# Patient Record
Sex: MALE | Race: OTHER | NOT HISPANIC OR LATINO
[De-identification: names, ages, dates, MRNs, and addresses within clinical notes are randomized per-mention and may not be internally consistent; named-entity substitution may affect disease eponyms.]

---

## 2017-01-03 ENCOUNTER — RX RENEWAL (OUTPATIENT)
Age: 82
End: 2017-01-03

## 2017-01-04 ENCOUNTER — APPOINTMENT (OUTPATIENT)
Dept: INTERNAL MEDICINE | Facility: CLINIC | Age: 82
End: 2017-01-04

## 2017-01-04 ENCOUNTER — MEDICATION RENEWAL (OUTPATIENT)
Age: 82
End: 2017-01-04

## 2017-01-04 LAB
INR PPP: 2.4 RATIO
POCT-PROTHROMBIN TIME: 29.4 SECS
QUALITY CONTROL: YES

## 2017-01-06 ENCOUNTER — MEDICATION RENEWAL (OUTPATIENT)
Age: 82
End: 2017-01-06

## 2017-01-10 ENCOUNTER — APPOINTMENT (OUTPATIENT)
Dept: UROLOGY | Facility: CLINIC | Age: 82
End: 2017-01-10

## 2017-01-11 ENCOUNTER — APPOINTMENT (OUTPATIENT)
Dept: INTERNAL MEDICINE | Facility: CLINIC | Age: 82
End: 2017-01-11

## 2017-01-11 LAB
INR PPP: 2.9 RATIO
POCT-PROTHROMBIN TIME: 35.4 SECS
QUALITY CONTROL: YES

## 2017-01-13 LAB
ANION GAP SERPL CALC-SCNC: 14 MMOL/L
BUN SERPL-MCNC: 21 MG/DL
CALCIUM SERPL-MCNC: 9.9 MG/DL
CHLORIDE SERPL-SCNC: 93 MMOL/L
CO2 SERPL-SCNC: 22 MMOL/L
CORTIS SERPL-MCNC: 13.6 UG/DL
CREAT SERPL-MCNC: 1.02 MG/DL
GLUCOSE SERPL-MCNC: 93 MG/DL
POTASSIUM SERPL-SCNC: 5.3 MMOL/L
SODIUM SERPL-SCNC: 129 MMOL/L

## 2017-01-20 ENCOUNTER — MEDICATION RENEWAL (OUTPATIENT)
Age: 82
End: 2017-01-20

## 2017-01-23 ENCOUNTER — MEDICATION RENEWAL (OUTPATIENT)
Age: 82
End: 2017-01-23

## 2017-02-01 ENCOUNTER — LABORATORY RESULT (OUTPATIENT)
Age: 82
End: 2017-02-01

## 2017-02-02 ENCOUNTER — APPOINTMENT (OUTPATIENT)
Dept: INTERNAL MEDICINE | Facility: CLINIC | Age: 82
End: 2017-02-02

## 2017-02-02 LAB
INR PPP: 3 RATIO
POCT-PROTHROMBIN TIME: 35.6 SECS
QUALITY CONTROL: YES

## 2017-02-03 ENCOUNTER — MEDICATION RENEWAL (OUTPATIENT)
Age: 82
End: 2017-02-03

## 2017-02-03 LAB
ALBUMIN SERPL ELPH-MCNC: 3.9 G/DL
ALP BLD-CCNC: 51 U/L
ALT SERPL-CCNC: 29 U/L
ANION GAP SERPL CALC-SCNC: 14 MMOL/L
AST SERPL-CCNC: 30 U/L
BASOPHILS # BLD AUTO: 0.03 K/UL
BASOPHILS NFR BLD AUTO: 0.4 %
BILIRUB SERPL-MCNC: 0.4 MG/DL
BUN SERPL-MCNC: 20 MG/DL
CALCIUM SERPL-MCNC: 9.1 MG/DL
CHLORIDE SERPL-SCNC: 102 MMOL/L
CO2 SERPL-SCNC: 22 MMOL/L
CREAT SERPL-MCNC: 0.99 MG/DL
EOSINOPHIL # BLD AUTO: 0.09 K/UL
EOSINOPHIL NFR BLD AUTO: 1.3 %
GLUCOSE SERPL-MCNC: 94 MG/DL
HCT VFR BLD CALC: 34.9 %
HGB BLD-MCNC: 11.4 G/DL
IMM GRANULOCYTES NFR BLD AUTO: 0.4 %
LYMPHOCYTES # BLD AUTO: 1.22 K/UL
LYMPHOCYTES NFR BLD AUTO: 17.4 %
MAN DIFF?: NORMAL
MCHC RBC-ENTMCNC: 31.6 PG
MCHC RBC-ENTMCNC: 32.7 GM/DL
MCV RBC AUTO: 96.7 FL
MONOCYTES # BLD AUTO: 0.86 K/UL
MONOCYTES NFR BLD AUTO: 12.3 %
NEUTROPHILS # BLD AUTO: 4.79 K/UL
NEUTROPHILS NFR BLD AUTO: 68.2 %
PLATELET # BLD AUTO: 91 K/UL
POTASSIUM SERPL-SCNC: 4.5 MMOL/L
PROT SERPL-MCNC: 6.5 G/DL
RBC # BLD: 3.61 M/UL
RBC # FLD: 14.8 %
SODIUM SERPL-SCNC: 138 MMOL/L
WBC # FLD AUTO: 7.02 K/UL

## 2017-02-14 ENCOUNTER — APPOINTMENT (OUTPATIENT)
Dept: NEPHROLOGY | Facility: CLINIC | Age: 82
End: 2017-02-14

## 2017-02-14 VITALS
BODY MASS INDEX: 31.71 KG/M2 | SYSTOLIC BLOOD PRESSURE: 134 MMHG | DIASTOLIC BLOOD PRESSURE: 70 MMHG | WEIGHT: 202 LBS | HEIGHT: 67 IN | OXYGEN SATURATION: 99 % | HEART RATE: 55 BPM

## 2017-02-15 LAB
ALBUMIN SERPL ELPH-MCNC: 4 G/DL
ANION GAP SERPL CALC-SCNC: 10 MMOL/L
BUN SERPL-MCNC: 24 MG/DL
CALCIUM SERPL-MCNC: 8.8 MG/DL
CHLORIDE SERPL-SCNC: 100 MMOL/L
CO2 SERPL-SCNC: 28 MMOL/L
CREAT SERPL-MCNC: 0.98 MG/DL
GLUCOSE SERPL-MCNC: 93 MG/DL
MAGNESIUM SERPL-MCNC: 2.1 MG/DL
PHOSPHATE SERPL-MCNC: 3.6 MG/DL
POTASSIUM SERPL-SCNC: 4.3 MMOL/L
SODIUM SERPL-SCNC: 138 MMOL/L
URATE SERPL-MCNC: 8.2 MG/DL

## 2017-02-22 ENCOUNTER — APPOINTMENT (OUTPATIENT)
Dept: INTERNAL MEDICINE | Facility: CLINIC | Age: 82
End: 2017-02-22

## 2017-02-22 ENCOUNTER — LABORATORY RESULT (OUTPATIENT)
Age: 82
End: 2017-02-22

## 2017-02-22 VITALS
OXYGEN SATURATION: 96 % | HEIGHT: 67 IN | SYSTOLIC BLOOD PRESSURE: 134 MMHG | DIASTOLIC BLOOD PRESSURE: 74 MMHG | HEART RATE: 54 BPM | WEIGHT: 197 LBS | BODY MASS INDEX: 30.92 KG/M2 | TEMPERATURE: 97.3 F

## 2017-02-22 DIAGNOSIS — R79.89 OTHER SPECIFIED ABNORMAL FINDINGS OF BLOOD CHEMISTRY: ICD-10-CM

## 2017-02-22 DIAGNOSIS — R05 COUGH: ICD-10-CM

## 2017-02-22 LAB
INR PPP: 2.5 RATIO
POCT-PROTHROMBIN TIME: 30 SECS

## 2017-02-22 RX ORDER — QUETIAPINE FUMARATE 25 MG/1
25 TABLET ORAL
Qty: 30 | Refills: 5 | Status: DISCONTINUED | COMMUNITY
Start: 2017-01-06 | End: 2017-02-22

## 2017-02-24 LAB
ANION GAP SERPL CALC-SCNC: 12 MMOL/L
BASOPHILS # BLD AUTO: 0.06 K/UL
BASOPHILS NFR BLD AUTO: 0.9 %
BUN SERPL-MCNC: 29 MG/DL
CALCIUM SERPL-MCNC: 9.1 MG/DL
CHLORIDE SERPL-SCNC: 101 MMOL/L
CO2 SERPL-SCNC: 25 MMOL/L
CREAT SERPL-MCNC: 1.38 MG/DL
EOSINOPHIL # BLD AUTO: 0.1 K/UL
EOSINOPHIL NFR BLD AUTO: 1.6 %
GLUCOSE SERPL-MCNC: 180 MG/DL
HCT VFR BLD CALC: 36 %
HGB BLD-MCNC: 11.6 G/DL
IMM GRANULOCYTES NFR BLD AUTO: 0.3 %
LYMPHOCYTES # BLD AUTO: 1.27 K/UL
LYMPHOCYTES NFR BLD AUTO: 20 %
MAN DIFF?: NORMAL
MCHC RBC-ENTMCNC: 31.4 PG
MCHC RBC-ENTMCNC: 32.2 GM/DL
MCV RBC AUTO: 97.3 FL
MONOCYTES # BLD AUTO: 0.76 K/UL
MONOCYTES NFR BLD AUTO: 12 %
NEUTROPHILS # BLD AUTO: 4.14 K/UL
NEUTROPHILS NFR BLD AUTO: 65.2 %
PLATELET # BLD AUTO: 78 K/UL
POTASSIUM SERPL-SCNC: 4.6 MMOL/L
RBC # BLD: 3.7 M/UL
RBC # FLD: 14.8 %
SODIUM SERPL-SCNC: 138 MMOL/L
WBC # FLD AUTO: 6.35 K/UL

## 2017-03-13 ENCOUNTER — MEDICATION RENEWAL (OUTPATIENT)
Age: 82
End: 2017-03-13

## 2017-03-21 ENCOUNTER — LABORATORY RESULT (OUTPATIENT)
Age: 82
End: 2017-03-21

## 2017-03-21 ENCOUNTER — NON-APPOINTMENT (OUTPATIENT)
Age: 82
End: 2017-03-21

## 2017-03-21 ENCOUNTER — APPOINTMENT (OUTPATIENT)
Dept: CARDIOLOGY | Facility: CLINIC | Age: 82
End: 2017-03-21

## 2017-03-21 VITALS
WEIGHT: 201.2 LBS | HEART RATE: 60 BPM | DIASTOLIC BLOOD PRESSURE: 85 MMHG | SYSTOLIC BLOOD PRESSURE: 170 MMHG | OXYGEN SATURATION: 97 % | TEMPERATURE: 98 F | BODY MASS INDEX: 31.58 KG/M2 | RESPIRATION RATE: 16 BRPM | HEIGHT: 67 IN

## 2017-03-22 ENCOUNTER — APPOINTMENT (OUTPATIENT)
Dept: INTERNAL MEDICINE | Facility: CLINIC | Age: 82
End: 2017-03-22

## 2017-03-22 LAB
BASOPHILS # BLD AUTO: 0.03 K/UL
BASOPHILS NFR BLD AUTO: 0.5 %
EOSINOPHIL # BLD AUTO: 0.07 K/UL
EOSINOPHIL NFR BLD AUTO: 1.2 %
HCT VFR BLD CALC: 36.1 %
HGB BLD-MCNC: 11.6 G/DL
IMM GRANULOCYTES NFR BLD AUTO: 0 %
INR PPP: 2.1 RATIO
LYMPHOCYTES # BLD AUTO: 1.04 K/UL
LYMPHOCYTES NFR BLD AUTO: 17.6 %
MAN DIFF?: NORMAL
MCHC RBC-ENTMCNC: 31.8 PG
MCHC RBC-ENTMCNC: 32.1 GM/DL
MCV RBC AUTO: 98.9 FL
MONOCYTES # BLD AUTO: 0.7 K/UL
MONOCYTES NFR BLD AUTO: 11.9 %
NEUTROPHILS # BLD AUTO: 4.06 K/UL
NEUTROPHILS NFR BLD AUTO: 68.8 %
PLATELET # BLD AUTO: 77 K/UL
POCT-PROTHROMBIN TIME: 25 SECS
QUALITY CONTROL: YES
RBC # BLD: 3.65 M/UL
RBC # FLD: 15 %
WBC # FLD AUTO: 5.9 K/UL

## 2017-03-24 LAB
ALBUMIN SERPL ELPH-MCNC: 3.9 G/DL
ALP BLD-CCNC: 70 U/L
ALT SERPL-CCNC: 18 U/L
ANION GAP SERPL CALC-SCNC: 21 MMOL/L
AST SERPL-CCNC: 21 U/L
BILIRUB SERPL-MCNC: 0.5 MG/DL
BUN SERPL-MCNC: 21 MG/DL
CALCIUM SERPL-MCNC: 9.3 MG/DL
CHLORIDE SERPL-SCNC: 101 MMOL/L
CHOLEST SERPL-MCNC: 116 MG/DL
CHOLEST/HDLC SERPL: 2.6 RATIO
CO2 SERPL-SCNC: 20 MMOL/L
CREAT SERPL-MCNC: 0.92 MG/DL
GLUCOSE SERPL-MCNC: 92 MG/DL
HDLC SERPL-MCNC: 45 MG/DL
LDLC SERPL CALC-MCNC: 58 MG/DL
LDLC SERPL DIRECT ASSAY-MCNC: 62 MG/DL
POTASSIUM SERPL-SCNC: 4.5 MMOL/L
PROT SERPL-MCNC: 6.3 G/DL
SODIUM SERPL-SCNC: 142 MMOL/L
TRIGL SERPL-MCNC: 66 MG/DL

## 2017-04-13 ENCOUNTER — MEDICATION RENEWAL (OUTPATIENT)
Age: 82
End: 2017-04-13

## 2017-04-14 ENCOUNTER — MEDICATION RENEWAL (OUTPATIENT)
Age: 82
End: 2017-04-14

## 2017-04-17 ENCOUNTER — APPOINTMENT (OUTPATIENT)
Dept: INTERNAL MEDICINE | Facility: CLINIC | Age: 82
End: 2017-04-17

## 2017-04-18 LAB
INR PPP: 2.4 RATIO
POCT-PROTHROMBIN TIME: 29.2 SECS
QUALITY CONTROL: YES

## 2017-04-19 ENCOUNTER — TRANSCRIPTION ENCOUNTER (OUTPATIENT)
Age: 82
End: 2017-04-19

## 2017-05-04 ENCOUNTER — MEDICATION RENEWAL (OUTPATIENT)
Age: 82
End: 2017-05-04

## 2017-05-15 ENCOUNTER — LABORATORY RESULT (OUTPATIENT)
Age: 82
End: 2017-05-15

## 2017-05-16 ENCOUNTER — APPOINTMENT (OUTPATIENT)
Dept: INTERNAL MEDICINE | Facility: CLINIC | Age: 82
End: 2017-05-16

## 2017-05-16 VITALS
DIASTOLIC BLOOD PRESSURE: 90 MMHG | WEIGHT: 191 LBS | OXYGEN SATURATION: 97 % | BODY MASS INDEX: 29.98 KG/M2 | SYSTOLIC BLOOD PRESSURE: 160 MMHG | TEMPERATURE: 97.7 F | HEART RATE: 68 BPM | HEIGHT: 67 IN

## 2017-05-16 DIAGNOSIS — Z00.00 ENCOUNTER FOR GENERAL ADULT MEDICAL EXAMINATION W/OUT ABNORMAL FINDINGS: ICD-10-CM

## 2017-05-16 DIAGNOSIS — H61.21 IMPACTED CERUMEN, RIGHT EAR: ICD-10-CM

## 2017-05-16 DIAGNOSIS — M25.572 PAIN IN LEFT ANKLE AND JOINTS OF LEFT FOOT: ICD-10-CM

## 2017-05-16 DIAGNOSIS — Z20.828 CONTACT WITH AND (SUSPECTED) EXPOSURE TO OTHER VIRAL COMMUNICABLE DISEASES: ICD-10-CM

## 2017-05-16 LAB
BILIRUB UR QL STRIP: NORMAL
GLUCOSE UR-MCNC: NORMAL
HCG UR QL: 2 EU/DL
HGB UR QL STRIP.AUTO: NORMAL
INR PPP: 2.3 RATIO
KETONES UR-MCNC: NORMAL
LEUKOCYTE ESTERASE UR QL STRIP: NORMAL
NITRITE UR QL STRIP: NORMAL
PH UR STRIP: 7
POCT-PROTHROMBIN TIME: 27 SECS
PROT UR STRIP-MCNC: 30
SP GR UR STRIP: 1.02

## 2017-05-16 RX ORDER — OSELTAMIVIR PHOSPHATE 75 MG/1
75 CAPSULE ORAL
Qty: 10 | Refills: 0 | Status: DISCONTINUED | COMMUNITY
Start: 2017-03-13 | End: 2017-05-16

## 2017-05-19 LAB
25(OH)D3 SERPL-MCNC: 35.2 NG/ML
ALBUMIN SERPL ELPH-MCNC: 4.4 G/DL
ALP BLD-CCNC: 68 U/L
ALT SERPL-CCNC: 20 U/L
ANION GAP SERPL CALC-SCNC: 16 MMOL/L
APPEARANCE: CLEAR
AST SERPL-CCNC: 25 U/L
BACTERIA UR CULT: NORMAL
BACTERIA: NEGATIVE
BASOPHILS # BLD AUTO: 0.04 K/UL
BASOPHILS NFR BLD AUTO: 0.5 %
BILIRUB SERPL-MCNC: 0.7 MG/DL
BILIRUBIN URINE: NEGATIVE
BLOOD URINE: ABNORMAL
BUN SERPL-MCNC: 23 MG/DL
CALCIUM SERPL-MCNC: 9.9 MG/DL
CHLORIDE SERPL-SCNC: 100 MMOL/L
CHOLEST SERPL-MCNC: 134 MG/DL
CHOLEST/HDLC SERPL: 2.7 RATIO
CO2 SERPL-SCNC: 22 MMOL/L
COLOR: YELLOW
CREAT SERPL-MCNC: 1.06 MG/DL
EOSINOPHIL # BLD AUTO: 0.08 K/UL
EOSINOPHIL NFR BLD AUTO: 1.1 %
GLUCOSE QUALITATIVE U: NORMAL MG/DL
GLUCOSE SERPL-MCNC: 85 MG/DL
HBA1C MFR BLD HPLC: 5.7 %
HCT VFR BLD CALC: 39.2 %
HDLC SERPL-MCNC: 49 MG/DL
HGB BLD-MCNC: 12.7 G/DL
HYALINE CASTS: 0 /LPF
IMM GRANULOCYTES NFR BLD AUTO: 0.1 %
KETONES URINE: NEGATIVE
LDLC SERPL CALC-MCNC: 75 MG/DL
LEUKOCYTE ESTERASE URINE: NEGATIVE
LYMPHOCYTES # BLD AUTO: 1.49 K/UL
LYMPHOCYTES NFR BLD AUTO: 20.2 %
MAN DIFF?: NORMAL
MCHC RBC-ENTMCNC: 30.6 PG
MCHC RBC-ENTMCNC: 32.4 GM/DL
MCV RBC AUTO: 94.5 FL
MICROSCOPIC-UA: NORMAL
MONOCYTES # BLD AUTO: 0.82 K/UL
MONOCYTES NFR BLD AUTO: 11.1 %
NEUTROPHILS # BLD AUTO: 4.95 K/UL
NEUTROPHILS NFR BLD AUTO: 67 %
NITRITE URINE: NEGATIVE
PH URINE: 6.5
PLATELET # BLD AUTO: 72 K/UL
POTASSIUM SERPL-SCNC: 4.4 MMOL/L
PROT SERPL-MCNC: 7.4 G/DL
PROTEIN URINE: 30 MG/DL
PSA SERPL-MCNC: <0.01 NG/ML
RBC # BLD: 4.15 M/UL
RBC # FLD: 15 %
RED BLOOD CELLS URINE: 1 /HPF
SODIUM SERPL-SCNC: 138 MMOL/L
SPECIFIC GRAVITY URINE: 1.02
SQUAMOUS EPITHELIAL CELLS: 0 /HPF
TRIGL SERPL-MCNC: 52 MG/DL
TSH SERPL-ACNC: 2.37 UIU/ML
URATE SERPL-MCNC: 7.1 MG/DL
UROBILINOGEN URINE: 1 MG/DL
WBC # FLD AUTO: 7.39 K/UL
WHITE BLOOD CELLS URINE: 1 /HPF

## 2017-05-30 ENCOUNTER — FORM ENCOUNTER (OUTPATIENT)
Age: 82
End: 2017-05-30

## 2017-05-31 ENCOUNTER — MEDICATION RENEWAL (OUTPATIENT)
Age: 82
End: 2017-05-31

## 2017-05-31 ENCOUNTER — APPOINTMENT (OUTPATIENT)
Dept: RADIOLOGY | Facility: IMAGING CENTER | Age: 82
End: 2017-05-31

## 2017-05-31 ENCOUNTER — OUTPATIENT (OUTPATIENT)
Dept: OUTPATIENT SERVICES | Facility: HOSPITAL | Age: 82
LOS: 1 days | End: 2017-05-31
Payer: MEDICARE

## 2017-05-31 DIAGNOSIS — R05 COUGH: ICD-10-CM

## 2017-05-31 PROCEDURE — 71046 X-RAY EXAM CHEST 2 VIEWS: CPT

## 2017-06-12 ENCOUNTER — RX RENEWAL (OUTPATIENT)
Age: 82
End: 2017-06-12

## 2017-06-12 ENCOUNTER — APPOINTMENT (OUTPATIENT)
Dept: INTERNAL MEDICINE | Facility: CLINIC | Age: 82
End: 2017-06-12

## 2017-06-12 LAB
INR PPP: 2.5 RATIO
POCT-PROTHROMBIN TIME: 29.4 SECS
QUALITY CONTROL: YES

## 2017-06-13 ENCOUNTER — MEDICATION RENEWAL (OUTPATIENT)
Age: 82
End: 2017-06-13

## 2017-06-19 LAB
ANION GAP SERPL CALC-SCNC: 14 MMOL/L
BUN SERPL-MCNC: 23 MG/DL
CALCIUM SERPL-MCNC: 9.5 MG/DL
CHLORIDE SERPL-SCNC: 99 MMOL/L
CO2 SERPL-SCNC: 22 MMOL/L
CREAT SERPL-MCNC: 1.01 MG/DL
GLUCOSE SERPL-MCNC: 102 MG/DL
POTASSIUM SERPL-SCNC: 4.6 MMOL/L
SODIUM SERPL-SCNC: 135 MMOL/L

## 2017-07-10 ENCOUNTER — APPOINTMENT (OUTPATIENT)
Dept: INTERNAL MEDICINE | Facility: CLINIC | Age: 82
End: 2017-07-10

## 2017-07-10 ENCOUNTER — LABORATORY RESULT (OUTPATIENT)
Age: 82
End: 2017-07-10

## 2017-07-10 LAB
INR PPP: 2.6 RATIO
POCT-PROTHROMBIN TIME: 31.1 SECS
QUALITY CONTROL: YES

## 2017-07-11 ENCOUNTER — MEDICATION RENEWAL (OUTPATIENT)
Age: 82
End: 2017-07-11

## 2017-07-14 LAB
BASOPHILS # BLD AUTO: 0.04 K/UL
BASOPHILS NFR BLD AUTO: 0.6 %
EOSINOPHIL # BLD AUTO: 0.07 K/UL
EOSINOPHIL NFR BLD AUTO: 1 %
HCT VFR BLD CALC: 37.4 %
HGB BLD-MCNC: 12.3 G/DL
IMM GRANULOCYTES NFR BLD AUTO: 0.1 %
LYMPHOCYTES # BLD AUTO: 1.26 K/UL
LYMPHOCYTES NFR BLD AUTO: 18.8 %
MAN DIFF?: NORMAL
MCHC RBC-ENTMCNC: 30.7 PG
MCHC RBC-ENTMCNC: 32.9 GM/DL
MCV RBC AUTO: 93.3 FL
MONOCYTES # BLD AUTO: 0.52 K/UL
MONOCYTES NFR BLD AUTO: 7.8 %
NEUTROPHILS # BLD AUTO: 4.79 K/UL
NEUTROPHILS NFR BLD AUTO: 71.7 %
PLATELET # BLD AUTO: 65 K/UL
RBC # BLD: 4.01 M/UL
RBC # FLD: 16.3 %
WBC # FLD AUTO: 6.69 K/UL

## 2017-08-08 ENCOUNTER — LABORATORY RESULT (OUTPATIENT)
Age: 82
End: 2017-08-08

## 2017-08-09 ENCOUNTER — RESULT CHARGE (OUTPATIENT)
Age: 82
End: 2017-08-09

## 2017-08-09 ENCOUNTER — APPOINTMENT (OUTPATIENT)
Dept: INTERNAL MEDICINE | Facility: CLINIC | Age: 82
End: 2017-08-09
Payer: MEDICARE

## 2017-08-09 LAB
INR PPP: 2.3 RATIO
POCT-PROTHROMBIN TIME: 27.6 SECS
QUALITY CONTROL: YES

## 2017-08-09 PROCEDURE — 85610 PROTHROMBIN TIME: CPT | Mod: QW

## 2017-08-09 PROCEDURE — 36415 COLL VENOUS BLD VENIPUNCTURE: CPT

## 2017-08-11 LAB
BASOPHILS # BLD AUTO: 0.05 K/UL
BASOPHILS NFR BLD AUTO: 0.7 %
EOSINOPHIL # BLD AUTO: 0.11 K/UL
EOSINOPHIL NFR BLD AUTO: 1.6 %
HCT VFR BLD CALC: 36.7 %
HGB BLD-MCNC: 12.3 G/DL
IMM GRANULOCYTES NFR BLD AUTO: 0.1 %
LYMPHOCYTES # BLD AUTO: 1.26 K/UL
LYMPHOCYTES NFR BLD AUTO: 18.3 %
MAN DIFF?: NORMAL
MCHC RBC-ENTMCNC: 30.9 PG
MCHC RBC-ENTMCNC: 33.5 GM/DL
MCV RBC AUTO: 92.2 FL
MONOCYTES # BLD AUTO: 0.74 K/UL
MONOCYTES NFR BLD AUTO: 10.7 %
NEUTROPHILS # BLD AUTO: 4.73 K/UL
NEUTROPHILS NFR BLD AUTO: 68.6 %
PLATELET # BLD AUTO: 59 K/UL
RBC # BLD: 3.98 M/UL
RBC # FLD: 15.9 %
WBC # FLD AUTO: 6.9 K/UL

## 2017-09-06 ENCOUNTER — MEDICATION RENEWAL (OUTPATIENT)
Age: 82
End: 2017-09-06

## 2017-09-11 ENCOUNTER — LABORATORY RESULT (OUTPATIENT)
Age: 82
End: 2017-09-11

## 2017-09-12 ENCOUNTER — APPOINTMENT (OUTPATIENT)
Dept: INTERNAL MEDICINE | Facility: CLINIC | Age: 82
End: 2017-09-12
Payer: MEDICARE

## 2017-09-12 VITALS
SYSTOLIC BLOOD PRESSURE: 130 MMHG | DIASTOLIC BLOOD PRESSURE: 70 MMHG | WEIGHT: 195 LBS | TEMPERATURE: 97.7 F | HEIGHT: 67 IN | HEART RATE: 73 BPM | OXYGEN SATURATION: 96 % | BODY MASS INDEX: 30.61 KG/M2

## 2017-09-12 DIAGNOSIS — Z92.29 PERSONAL HISTORY OF OTHER DRUG THERAPY: ICD-10-CM

## 2017-09-12 LAB
INR PPP: 2.7 RATIO
POCT-PROTHROMBIN TIME: 34.2 SECS
QUALITY CONTROL: YES

## 2017-09-12 PROCEDURE — 90662 IIV NO PRSV INCREASED AG IM: CPT

## 2017-09-12 PROCEDURE — 36415 COLL VENOUS BLD VENIPUNCTURE: CPT

## 2017-09-12 PROCEDURE — 85610 PROTHROMBIN TIME: CPT | Mod: QW

## 2017-09-12 PROCEDURE — G0008: CPT

## 2017-09-12 PROCEDURE — 99214 OFFICE O/P EST MOD 30 MIN: CPT | Mod: 25

## 2017-09-12 RX ORDER — PAROXETINE HYDROCHLORIDE 20 MG/1
20 TABLET, FILM COATED ORAL
Qty: 30 | Refills: 0 | Status: DISCONTINUED | COMMUNITY
Start: 2017-02-27 | End: 2017-09-12

## 2017-09-12 RX ORDER — METHYLPREDNISOLONE 4 MG/1
4 TABLET ORAL
Qty: 1 | Refills: 1 | Status: DISCONTINUED | COMMUNITY
Start: 2017-05-31 | End: 2017-09-12

## 2017-09-18 LAB
25(OH)D3 SERPL-MCNC: 32.9 NG/ML
ALBUMIN SERPL ELPH-MCNC: 3.8 G/DL
ALP BLD-CCNC: 58 U/L
ALT SERPL-CCNC: 16 U/L
ANION GAP SERPL CALC-SCNC: 14 MMOL/L
AST SERPL-CCNC: 24 U/L
BASOPHILS # BLD AUTO: 0.05 K/UL
BASOPHILS NFR BLD AUTO: 0.7 %
BILIRUB SERPL-MCNC: 0.6 MG/DL
BUN SERPL-MCNC: 23 MG/DL
CALCIUM SERPL-MCNC: 9.3 MG/DL
CHLORIDE SERPL-SCNC: 102 MMOL/L
CHOLEST SERPL-MCNC: 127 MG/DL
CHOLEST/HDLC SERPL: 2.5 RATIO
CO2 SERPL-SCNC: 25 MMOL/L
CREAT SERPL-MCNC: 0.97 MG/DL
EOSINOPHIL # BLD AUTO: 0.12 K/UL
EOSINOPHIL NFR BLD AUTO: 1.6 %
GLUCOSE SERPL-MCNC: 98 MG/DL
HBA1C MFR BLD HPLC: 5.5 %
HCT VFR BLD CALC: 36.6 %
HDLC SERPL-MCNC: 50 MG/DL
HGB BLD-MCNC: 12 G/DL
IMM GRANULOCYTES NFR BLD AUTO: 0.3 %
LDLC SERPL CALC-MCNC: 63 MG/DL
LYMPHOCYTES # BLD AUTO: 1.35 K/UL
LYMPHOCYTES NFR BLD AUTO: 18 %
MAN DIFF?: NORMAL
MCHC RBC-ENTMCNC: 31.7 PG
MCHC RBC-ENTMCNC: 32.8 GM/DL
MCV RBC AUTO: 96.6 FL
MONOCYTES # BLD AUTO: 0.67 K/UL
MONOCYTES NFR BLD AUTO: 8.9 %
NEUTROPHILS # BLD AUTO: 5.28 K/UL
NEUTROPHILS NFR BLD AUTO: 70.5 %
PLATELET # BLD AUTO: 66 K/UL
POTASSIUM SERPL-SCNC: 4.2 MMOL/L
PROT SERPL-MCNC: 7 G/DL
RBC # BLD: 3.79 M/UL
RBC # FLD: 15.7 %
SODIUM SERPL-SCNC: 141 MMOL/L
TRIGL SERPL-MCNC: 68 MG/DL
TSH SERPL-ACNC: 2.26 UIU/ML
URATE SERPL-MCNC: 7.4 MG/DL
WBC # FLD AUTO: 7.49 K/UL

## 2017-09-26 ENCOUNTER — APPOINTMENT (OUTPATIENT)
Dept: CARDIOLOGY | Facility: CLINIC | Age: 82
End: 2017-09-26
Payer: MEDICARE

## 2017-09-26 ENCOUNTER — NON-APPOINTMENT (OUTPATIENT)
Age: 82
End: 2017-09-26

## 2017-09-26 VITALS
SYSTOLIC BLOOD PRESSURE: 150 MMHG | WEIGHT: 194 LBS | BODY MASS INDEX: 30.45 KG/M2 | DIASTOLIC BLOOD PRESSURE: 72 MMHG | HEART RATE: 70 BPM | HEIGHT: 67 IN

## 2017-09-26 PROCEDURE — 99215 OFFICE O/P EST HI 40 MIN: CPT

## 2017-09-26 PROCEDURE — 99214 OFFICE O/P EST MOD 30 MIN: CPT

## 2017-09-26 PROCEDURE — 93000 ELECTROCARDIOGRAM COMPLETE: CPT

## 2017-09-26 PROCEDURE — 93306 TTE W/DOPPLER COMPLETE: CPT

## 2017-09-27 ENCOUNTER — APPOINTMENT (OUTPATIENT)
Dept: INTERNAL MEDICINE | Facility: CLINIC | Age: 82
End: 2017-09-27
Payer: MEDICARE

## 2017-09-27 VITALS
SYSTOLIC BLOOD PRESSURE: 160 MMHG | WEIGHT: 192 LBS | DIASTOLIC BLOOD PRESSURE: 80 MMHG | BODY MASS INDEX: 30.13 KG/M2 | TEMPERATURE: 97.9 F | HEIGHT: 67 IN | HEART RATE: 56 BPM | OXYGEN SATURATION: 98 %

## 2017-09-27 DIAGNOSIS — R60.0 LOCALIZED EDEMA: ICD-10-CM

## 2017-09-27 PROCEDURE — 99213 OFFICE O/P EST LOW 20 MIN: CPT

## 2017-09-28 ENCOUNTER — MEDICATION RENEWAL (OUTPATIENT)
Age: 82
End: 2017-09-28

## 2017-09-29 ENCOUNTER — INPATIENT (INPATIENT)
Facility: HOSPITAL | Age: 82
LOS: 3 days | Discharge: NURSING, REHAB OR LONG-TERM CARE | DRG: 562 | End: 2017-10-03
Attending: HOSPITALIST | Admitting: HOSPITALIST
Payer: MEDICARE

## 2017-09-29 VITALS
TEMPERATURE: 97 F | OXYGEN SATURATION: 100 % | RESPIRATION RATE: 19 BRPM | HEART RATE: 69 BPM | SYSTOLIC BLOOD PRESSURE: 210 MMHG | DIASTOLIC BLOOD PRESSURE: 100 MMHG

## 2017-09-29 DIAGNOSIS — S42.009A FRACTURE OF UNSPECIFIED PART OF UNSPECIFIED CLAVICLE, INITIAL ENCOUNTER FOR CLOSED FRACTURE: ICD-10-CM
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
## 2017-09-29 DIAGNOSIS — D69.3 IMMUNE THROMBOCYTOPENIC PURPURA: ICD-10-CM

## 2017-09-29 DIAGNOSIS — I16.0 HYPERTENSIVE URGENCY: ICD-10-CM

## 2017-09-29 DIAGNOSIS — Z29.9 ENCOUNTER FOR PROPHYLACTIC MEASURES, UNSPECIFIED: ICD-10-CM

## 2017-09-29 DIAGNOSIS — Z95.1 PRESENCE OF AORTOCORONARY BYPASS GRAFT: Chronic | ICD-10-CM

## 2017-09-29 DIAGNOSIS — I71.2 THORACIC AORTIC ANEURYSM, WITHOUT RUPTURE: ICD-10-CM

## 2017-09-29 DIAGNOSIS — I25.10 ATHEROSCLEROTIC HEART DISEASE OF NATIVE CORONARY ARTERY WITHOUT ANGINA PECTORIS: ICD-10-CM

## 2017-09-29 DIAGNOSIS — I48.91 UNSPECIFIED ATRIAL FIBRILLATION: ICD-10-CM

## 2017-09-29 DIAGNOSIS — I71.02 DISSECTION OF ABDOMINAL AORTA: ICD-10-CM

## 2017-09-29 DIAGNOSIS — E78.5 HYPERLIPIDEMIA, UNSPECIFIED: ICD-10-CM

## 2017-09-29 LAB
ALBUMIN SERPL ELPH-MCNC: 4.7 G/DL — SIGNIFICANT CHANGE UP (ref 3.3–5)
ALP SERPL-CCNC: 73 U/L — SIGNIFICANT CHANGE UP (ref 40–120)
ALT FLD-CCNC: 22 U/L RC — SIGNIFICANT CHANGE UP (ref 10–45)
ANION GAP SERPL CALC-SCNC: 13 MMOL/L — SIGNIFICANT CHANGE UP (ref 5–17)
APPEARANCE UR: CLEAR — SIGNIFICANT CHANGE UP
APTT BLD: 40 SEC — HIGH (ref 27.5–37.4)
AST SERPL-CCNC: 28 U/L — SIGNIFICANT CHANGE UP (ref 10–40)
BASE EXCESS BLDV CALC-SCNC: 2.4 MMOL/L — HIGH (ref -2–2)
BASOPHILS # BLD AUTO: 0 K/UL — SIGNIFICANT CHANGE UP (ref 0–0.2)
BASOPHILS NFR BLD AUTO: 0.5 % — SIGNIFICANT CHANGE UP (ref 0–2)
BILIRUB SERPL-MCNC: 0.7 MG/DL — SIGNIFICANT CHANGE UP (ref 0.2–1.2)
BILIRUB UR-MCNC: NEGATIVE — SIGNIFICANT CHANGE UP
BUN SERPL-MCNC: 20 MG/DL — SIGNIFICANT CHANGE UP (ref 7–23)
CA-I SERPL-SCNC: 1.2 MMOL/L — SIGNIFICANT CHANGE UP (ref 1.12–1.3)
CALCIUM SERPL-MCNC: 9.7 MG/DL — SIGNIFICANT CHANGE UP (ref 8.4–10.5)
CHLORIDE BLDV-SCNC: 104 MMOL/L — SIGNIFICANT CHANGE UP (ref 96–108)
CHLORIDE SERPL-SCNC: 102 MMOL/L — SIGNIFICANT CHANGE UP (ref 96–108)
CO2 BLDV-SCNC: 30 MMOL/L — SIGNIFICANT CHANGE UP (ref 22–30)
CO2 SERPL-SCNC: 26 MMOL/L — SIGNIFICANT CHANGE UP (ref 22–31)
COLOR SPEC: COLORLESS — SIGNIFICANT CHANGE UP
CREAT SERPL-MCNC: 1.01 MG/DL — SIGNIFICANT CHANGE UP (ref 0.5–1.3)
DIFF PNL FLD: NEGATIVE — SIGNIFICANT CHANGE UP
EOSINOPHIL # BLD AUTO: 0.1 K/UL — SIGNIFICANT CHANGE UP (ref 0–0.5)
EOSINOPHIL NFR BLD AUTO: 1.3 % — SIGNIFICANT CHANGE UP (ref 0–6)
GAS PNL BLDV: 137 MMOL/L — SIGNIFICANT CHANGE UP (ref 136–145)
GAS PNL BLDV: SIGNIFICANT CHANGE UP
GAS PNL BLDV: SIGNIFICANT CHANGE UP
GLUCOSE BLDV-MCNC: 93 MG/DL — SIGNIFICANT CHANGE UP (ref 70–99)
GLUCOSE SERPL-MCNC: 96 MG/DL — SIGNIFICANT CHANGE UP (ref 70–99)
GLUCOSE UR QL: NEGATIVE — SIGNIFICANT CHANGE UP
HCO3 BLDV-SCNC: 28 MMOL/L — SIGNIFICANT CHANGE UP (ref 21–29)
HCT VFR BLD CALC: 37.7 % — LOW (ref 39–50)
HCT VFR BLDA CALC: 46 % — SIGNIFICANT CHANGE UP (ref 39–50)
HGB BLD CALC-MCNC: 15 G/DL — SIGNIFICANT CHANGE UP (ref 13–17)
HGB BLD-MCNC: 12.8 G/DL — LOW (ref 13–17)
INR BLD: 2.61 RATIO — HIGH (ref 0.88–1.16)
KETONES UR-MCNC: NEGATIVE — SIGNIFICANT CHANGE UP
LACTATE BLDV-MCNC: 1.3 MMOL/L — SIGNIFICANT CHANGE UP (ref 0.7–2)
LEUKOCYTE ESTERASE UR-ACNC: NEGATIVE — SIGNIFICANT CHANGE UP
LG PLATELETS BLD QL AUTO: SLIGHT — SIGNIFICANT CHANGE UP
LIDOCAIN IGE QN: 20 U/L — SIGNIFICANT CHANGE UP (ref 7–60)
LYMPHOCYTES # BLD AUTO: 1.4 K/UL — SIGNIFICANT CHANGE UP (ref 1–3.3)
LYMPHOCYTES # BLD AUTO: 19.2 % — SIGNIFICANT CHANGE UP (ref 13–44)
MCHC RBC-ENTMCNC: 33.8 PG — SIGNIFICANT CHANGE UP (ref 27–34)
MCHC RBC-ENTMCNC: 34.1 GM/DL — SIGNIFICANT CHANGE UP (ref 32–36)
MCV RBC AUTO: 99.3 FL — SIGNIFICANT CHANGE UP (ref 80–100)
MONOCYTES # BLD AUTO: 0.8 K/UL — SIGNIFICANT CHANGE UP (ref 0–0.9)
MONOCYTES NFR BLD AUTO: 10.9 % — SIGNIFICANT CHANGE UP (ref 2–14)
NEUTROPHILS # BLD AUTO: 4.9 K/UL — SIGNIFICANT CHANGE UP (ref 1.8–7.4)
NEUTROPHILS NFR BLD AUTO: 68.1 % — SIGNIFICANT CHANGE UP (ref 43–77)
NITRITE UR-MCNC: NEGATIVE — SIGNIFICANT CHANGE UP
PCO2 BLDV: 50 MMHG — SIGNIFICANT CHANGE UP (ref 35–50)
PH BLDV: 7.37 — SIGNIFICANT CHANGE UP (ref 7.35–7.45)
PH UR: 7 — SIGNIFICANT CHANGE UP (ref 5–8)
PLAT MORPH BLD: NORMAL — SIGNIFICANT CHANGE UP
PLATELET # BLD AUTO: 57 K/UL — LOW (ref 150–400)
PO2 BLDV: 30 MMHG — SIGNIFICANT CHANGE UP (ref 25–45)
POTASSIUM BLDV-SCNC: 4.2 MMOL/L — SIGNIFICANT CHANGE UP (ref 3.5–5)
POTASSIUM SERPL-MCNC: 4.3 MMOL/L — SIGNIFICANT CHANGE UP (ref 3.5–5.3)
POTASSIUM SERPL-SCNC: 4.3 MMOL/L — SIGNIFICANT CHANGE UP (ref 3.5–5.3)
PROT SERPL-MCNC: 7.9 G/DL — SIGNIFICANT CHANGE UP (ref 6–8.3)
PROT UR-MCNC: NEGATIVE — SIGNIFICANT CHANGE UP
PROTHROM AB SERPL-ACNC: 28.8 SEC — HIGH (ref 9.8–12.7)
RBC # BLD: 3.79 M/UL — LOW (ref 4.2–5.8)
RBC # FLD: 13.2 % — SIGNIFICANT CHANGE UP (ref 10.3–14.5)
RBC BLD AUTO: NORMAL — SIGNIFICANT CHANGE UP
SAO2 % BLDV: 48 % — LOW (ref 67–88)
SODIUM SERPL-SCNC: 141 MMOL/L — SIGNIFICANT CHANGE UP (ref 135–145)
SP GR SPEC: 1.01 — SIGNIFICANT CHANGE UP (ref 1.01–1.02)
UROBILINOGEN FLD QL: NEGATIVE — SIGNIFICANT CHANGE UP
WBC # BLD: 7.2 K/UL — SIGNIFICANT CHANGE UP (ref 3.8–10.5)
WBC # FLD AUTO: 7.2 K/UL — SIGNIFICANT CHANGE UP (ref 3.8–10.5)

## 2017-09-29 PROCEDURE — 99221 1ST HOSP IP/OBS SF/LOW 40: CPT

## 2017-09-29 PROCEDURE — 72170 X-RAY EXAM OF PELVIS: CPT | Mod: 26

## 2017-09-29 PROCEDURE — 73130 X-RAY EXAM OF HAND: CPT | Mod: 26,RT

## 2017-09-29 PROCEDURE — 71010: CPT | Mod: 26

## 2017-09-29 PROCEDURE — 72125 CT NECK SPINE W/O DYE: CPT | Mod: 26

## 2017-09-29 PROCEDURE — 99223 1ST HOSP IP/OBS HIGH 75: CPT | Mod: GC

## 2017-09-29 PROCEDURE — 73030 X-RAY EXAM OF SHOULDER: CPT | Mod: 26,RT

## 2017-09-29 PROCEDURE — 99285 EMERGENCY DEPT VISIT HI MDM: CPT | Mod: 25

## 2017-09-29 PROCEDURE — 73060 X-RAY EXAM OF HUMERUS: CPT | Mod: 26,RT

## 2017-09-29 PROCEDURE — 73110 X-RAY EXAM OF WRIST: CPT | Mod: 26,RT

## 2017-09-29 PROCEDURE — 70450 CT HEAD/BRAIN W/O DYE: CPT | Mod: 26

## 2017-09-29 PROCEDURE — 74177 CT ABD & PELVIS W/CONTRAST: CPT | Mod: 26

## 2017-09-29 PROCEDURE — 71260 CT THORAX DX C+: CPT | Mod: 26

## 2017-09-29 PROCEDURE — 73090 X-RAY EXAM OF FOREARM: CPT | Mod: 26,LT

## 2017-09-29 PROCEDURE — 93010 ELECTROCARDIOGRAM REPORT: CPT

## 2017-09-29 PROCEDURE — 73080 X-RAY EXAM OF ELBOW: CPT | Mod: 26,RT

## 2017-09-29 RX ORDER — HYDRALAZINE HCL 50 MG
5 TABLET ORAL ONCE
Qty: 0 | Refills: 0 | Status: COMPLETED | OUTPATIENT
Start: 2017-09-29 | End: 2017-09-29

## 2017-09-29 RX ORDER — ATENOLOL 25 MG/1
1 TABLET ORAL
Qty: 0 | Refills: 0 | COMMUNITY

## 2017-09-29 RX ORDER — HYDRALAZINE HCL 50 MG
10 TABLET ORAL ONCE
Qty: 0 | Refills: 0 | Status: COMPLETED | OUTPATIENT
Start: 2017-09-29 | End: 2017-09-29

## 2017-09-29 RX ORDER — ACETAMINOPHEN 500 MG
1000 TABLET ORAL ONCE
Qty: 0 | Refills: 0 | Status: COMPLETED | OUTPATIENT
Start: 2017-09-29 | End: 2017-09-29

## 2017-09-29 RX ORDER — HYDRALAZINE HCL 50 MG
10 TABLET ORAL EVERY 8 HOURS
Qty: 0 | Refills: 0 | Status: DISCONTINUED | OUTPATIENT
Start: 2017-09-29 | End: 2017-09-30

## 2017-09-29 RX ORDER — TAMSULOSIN HYDROCHLORIDE 0.4 MG/1
0.4 CAPSULE ORAL AT BEDTIME
Qty: 0 | Refills: 0 | Status: DISCONTINUED | OUTPATIENT
Start: 2017-09-29 | End: 2017-10-03

## 2017-09-29 RX ORDER — ACETAMINOPHEN 500 MG
650 TABLET ORAL EVERY 6 HOURS
Qty: 0 | Refills: 0 | Status: DISCONTINUED | OUTPATIENT
Start: 2017-09-29 | End: 2017-10-03

## 2017-09-29 RX ORDER — POTASSIUM CHLORIDE 20 MEQ
10 PACKET (EA) ORAL DAILY
Qty: 0 | Refills: 0 | Status: DISCONTINUED | OUTPATIENT
Start: 2017-09-29 | End: 2017-10-03

## 2017-09-29 RX ORDER — TETANUS TOXOID, REDUCED DIPHTHERIA TOXOID AND ACELLULAR PERTUSSIS VACCINE, ADSORBED 5; 2.5; 8; 8; 2.5 [IU]/.5ML; [IU]/.5ML; UG/.5ML; UG/.5ML; UG/.5ML
0.5 SUSPENSION INTRAMUSCULAR ONCE
Qty: 0 | Refills: 0 | Status: COMPLETED | OUTPATIENT
Start: 2017-09-29 | End: 2017-09-29

## 2017-09-29 RX ORDER — LISINOPRIL 2.5 MG/1
40 TABLET ORAL DAILY
Qty: 0 | Refills: 0 | Status: DISCONTINUED | OUTPATIENT
Start: 2017-09-29 | End: 2017-10-03

## 2017-09-29 RX ORDER — FINASTERIDE 5 MG/1
5 TABLET, FILM COATED ORAL DAILY
Qty: 0 | Refills: 0 | Status: DISCONTINUED | OUTPATIENT
Start: 2017-09-29 | End: 2017-10-03

## 2017-09-29 RX ORDER — ATORVASTATIN CALCIUM 80 MG/1
10 TABLET, FILM COATED ORAL AT BEDTIME
Qty: 0 | Refills: 0 | Status: DISCONTINUED | OUTPATIENT
Start: 2017-09-29 | End: 2017-10-03

## 2017-09-29 RX ORDER — FUROSEMIDE 40 MG
20 TABLET ORAL DAILY
Qty: 0 | Refills: 0 | Status: DISCONTINUED | OUTPATIENT
Start: 2017-09-29 | End: 2017-10-03

## 2017-09-29 RX ORDER — DULOXETINE HYDROCHLORIDE 30 MG/1
1 CAPSULE, DELAYED RELEASE ORAL
Qty: 0 | Refills: 0 | COMMUNITY

## 2017-09-29 RX ADMIN — Medication 5 MILLIGRAM(S): at 16:15

## 2017-09-29 RX ADMIN — Medication 5 MILLIGRAM(S): at 17:38

## 2017-09-29 RX ADMIN — TAMSULOSIN HYDROCHLORIDE 0.4 MILLIGRAM(S): 0.4 CAPSULE ORAL at 23:59

## 2017-09-29 RX ADMIN — Medication 400 MILLIGRAM(S): at 13:00

## 2017-09-29 RX ADMIN — TETANUS TOXOID, REDUCED DIPHTHERIA TOXOID AND ACELLULAR PERTUSSIS VACCINE, ADSORBED 0.5 MILLILITER(S): 5; 2.5; 8; 8; 2.5 SUSPENSION INTRAMUSCULAR at 13:46

## 2017-09-29 RX ADMIN — Medication 5 MILLIGRAM(S): at 14:51

## 2017-09-29 RX ADMIN — Medication 1000 MILLIGRAM(S): at 19:31

## 2017-09-29 RX ADMIN — Medication 10 MILLIGRAM(S): at 16:15

## 2017-09-29 NOTE — CONSULT NOTE ADULT - SUBJECTIVE AND OBJECTIVE BOX
Level 2 Trauma Activation    HPI:  Patient is a 86y year old male with PMHx of A fib on Coumadin, HTN, ITP, HLD, s/p CABG 1995. Pt presented to [facility] ED on 9/29/2017 as a Level 2 trauma activation. Per Pt, he was walking down the front stairs of his home when he slipped and fell, hitting his head and R shoulder on the ground. Pt states he normally ambulates w/o assistance in the house, but requires a walker outside for dizziness. He denies prior falls. He denies LOC, HA, vision change, neck pain, numbness, tingling, weakness, confusion, nausea, emesis, CP, SoB, or abdominal pain.      Primary Survey  A - airway intact  B - bilateral breath sounds and good chest rise  C - initially BP: 210/100 (09-29-17 @ 12:44), palpable pulses in all extremities  D - GCS 15 on arrival  Exposure obtained      Secondary survey  Gen: NAD  HEENT: PERRLA, pupils 2mm b/l, EOMI, 3cm laceration on R posterior aspect of scalp  CV: A fib  Pulm: CTA B/L  Chest: No TTP, b/l chest rise  Abd: Soft, ND, NT, no rebound, no guarding, rectus diastasis appreciated, no flank hematoma  Groin: Normal appearing, no blood at urethral meatus or per rectum  Ext: palp radial BUE, palp DP BLE, 2cm laceration to R lateral elbow/upperarm, 1cm abrasion to R lateral shin  Back: no TTP, no palpable runoff/stepoff/deformity    PMH  Atrial fibrillation  Hyperlipidemia  Hypertension    PSH  CABG 1995    MEDS    Allergies  No Known Allergies    Social  Denies Tobacco, EtOH, recreational drug use  Lives w/ wife and daughter  Retired    Labs:                        12.8   7.2   )-----------( x        ( 29 Sep 2017 12:53 )             37.7     09-29    141  |  102  |  20  ----------------------------<  96  4.3   |  26  |  1.01    Ca    9.7      29 Sep 2017 12:53    TPro  7.9  /  Alb  4.7  /  TBili  0.7  /  DBili  x   /  AST  28  /  ALT  22  /  AlkPhos  73  09-29    PT/INR - ( 29 Sep 2017 12:53 )   PT: 28.8 sec;   INR: 2.61 ratio         PTT - ( 29 Sep 2017 12:53 )  PTT:40.0 sec    Imaging  < from: Xray Chest 1 View AP- PORTABLE-Urgent (09.29.17 @ 12:43) >  IMPRESSION:   Clear lungs. No acute osseous abnormalities.    < end of copied text >    CT H/C-S/C/A/P read pending, no acute pathology seen on prelim Level 2 Trauma Activation    HPI:  Patient is a 86y year old male with PMHx of A fib on Coumadin, HTN, ITP, HLD, s/p CABG 1995. Pt presented to [facility] ED on 9/29/2017 as a Level 2 trauma activation. Per Pt, he was walking down the front stairs of his home when he slipped and fell, hitting his head and R shoulder on the ground. Pt states he normally ambulates w/o assistance in the house, but requires a walker outside for dizziness. He denies prior falls. He denies LOC, HA, vision change, neck pain, numbness, tingling, weakness, confusion, nausea, emesis, CP, SoB, or abdominal pain.      Primary Survey  A - airway intact  B - bilateral breath sounds and good chest rise  C - initially BP: 210/100 (09-29-17 @ 12:44), palpable pulses in all extremities  D - GCS 15 on arrival  Exposure obtained      Secondary survey  Gen: NAD  HEENT: PERRLA, pupils 2mm b/l, EOMI, 3cm laceration on R posterior aspect of scalp  CV: A fib  Pulm: CTA B/L  Chest: No TTP, b/l chest rise  Abd: Soft, ND, NT, no rebound, no guarding, rectus diastasis appreciated, no flank hematoma  Groin: Normal appearing, no blood at urethral meatus or per rectum  Ext: palp radial BUE, palp DP BLE, 2cm laceration to R lateral elbow/upperarm, 1cm abrasion to R lateral shin  Back: no TTP, no palpable runoff/stepoff/deformity    PMH  Atrial fibrillation  Hyperlipidemia  Hypertension    PSH  CABG 1995    MEDS    Allergies  No Known Allergies    Social  Denies Tobacco, EtOH, recreational drug use  Lives w/ wife and daughter  Retired    Labs:                        12.8   7.2   )-----------( x        ( 29 Sep 2017 12:53 )             37.7     09-29    141  |  102  |  20  ----------------------------<  96  4.3   |  26  |  1.01    Ca    9.7      29 Sep 2017 12:53    TPro  7.9  /  Alb  4.7  /  TBili  0.7  /  DBili  x   /  AST  28  /  ALT  22  /  AlkPhos  73  09-29    PT/INR - ( 29 Sep 2017 12:53 )   PT: 28.8 sec;   INR: 2.61 ratio         PTT - ( 29 Sep 2017 12:53 )  PTT:40.0 sec    Imaging  < from: Xray Chest 1 View AP- PORTABLE-Urgent (09.29.17 @ 12:43) >  IMPRESSION:   Clear lungs. No acute osseous abnormalities.    < end of copied text >    < from: Xray Forearm, Right (09.29.17 @ 14:27) >  IMPRESSION:  1.  Minimally displaced distal clavicle fracture extending into the right   AC joint.  2.  No additional fracture or dislocation is identified in the right   upper extremity.  3.  Degenerative change in the joints of the elbow and hand, as described   above.    < end of copied text >    < from: CT Head No Cont (09.29.17 @ 14:02) >  IMPRESSION:     CT Head: No acute intracranial hemorrhage or mass effect. No displaced   calvarial fracture.    CT Cervical Spine: No acute fracture or traumatic subluxation. No   prevertebral soft tissue swelling. Degenerative changes.    < end of copied text >    < from: CT Abdomen and Pelvis w/ IV Cont (09.29.17 @ 14:13) >  IMPRESSION:   Extensive atherosclerosis with an age-indeterminate focal dissection flap   in the infrarenal abdominal aorta and left common iliac artery.    < end of copied text >

## 2017-09-29 NOTE — H&P ADULT - PROBLEM SELECTOR PLAN 1
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, incidentally found  -patient reports unexplained LE edema for past 2+ years, may be related to dissection  -palpable pulses on exam  -plan for LE Arterial Duplex to assess the extent of dissection  -evaluated by vasc Sx, appreciate recs -likely chronic dissection, incidentally found, palpable pulses on exam  -resume home regimen with Hydralazine PRN for SBP >170 for BP control  -plan for LE Arterial Duplex to assess the extent of dissection  -will continue AC given chronicity of dissection  -evaluated by vasc Sx, appreciate recs -likely chronic dissection, incidentally found, palpable pulses on exam; warm extremities  -resume home regimen with Hydralazine PRN for SBP >170 for BP control  -plan for LE Arterial Duplex to assess the extent of dissection;   -will continue anticoagulation with coumadin  -evaluated by ashley Serna, appreciate recs

## 2017-09-29 NOTE — H&P ADULT - PROBLEM SELECTOR PLAN 6
-rate controlled without medication  -Coumadin for AC, daily INR, dose acordingly. Home schedule is 5mg M/W/F/Sat and 7.5mg Tu/Th/Sun. currently therapeutic  -c/w tele monitoring -rate controlled without medication  -Coumadin for AC, daily INR, dose accordingly. Home schedule is 5mg M/W/F/Sat and 7.5mg Tu/Th/Sun. currently therapeutic  -c/w tele monitoring

## 2017-09-29 NOTE — H&P ADULT - PROBLEM SELECTOR PLAN 2
-BPs likely acutely elevated in setting of trauma  -now normotensive, as this was an acute BP change no need for gradual reduction  -c/w home regiemen with appropriate hold parameters: Lisinopril 40mg and Lasix 20mg  -no evidence of end-organ damager -BPs likely acutely elevated in setting of trauma  -now normotensive, as this was an acute BP change no need for gradual reduction  -c/w home regimen with appropriate hold parameters: Lisinopril 40mg and Lasix 20mg  -no evidence of end-organ damager -BPs likely acutely elevated in setting of trauma  -now normotensive, as this was an acute BP change no need for gradual reduction  -c/w home regimen with appropriate hold parameters: Lisinopril 40mg and Lasix 20mg  -no evidence of end-organ damage

## 2017-09-29 NOTE — CONSULT NOTE ADULT - SUBJECTIVE AND OBJECTIVE BOX
VASCULAR SURGERY CONSULT NOTE  --------------------------------------------------------------------------------------------    HPI: Patient is a 86y year old male with PMHx of A fib on Coumadin, HTN, ITP, HLD, s/p CABG . Pt presented to [facility] ED on 2017 as a Level 2 trauma activation. Per Pt, he was walking down the front stairs of his home when he slipped and fell, hitting his head and R shoulder on the ground. Pt states he normally ambulates w/o assistance in the house, but requires a walker outside for dizziness. He denies prior falls. He denies LOC, HA, vision change, neck pain, numbness, tingling, weakness, confusion, nausea, emesis, CP, SoB, or abdominal pain.    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     ROS: 10-system review is otherwise negative except HPI above.      PAST MEDICAL & SURGICAL HISTORY:  Atrial fibrillation  Hyperlipidemia  Hypertension  S/P CABG (coronary artery bypass graft)    FAMILY HISTORY:    [x] Family history not pertinent as reviewed with the patient and family    SOCIAL HISTORY:  Denies Tobacco, EtOH, or recreational drug use    ALLERGIES: No Known Allergies      CURRENT MEDICATIONS  MEDICATIONS (STANDING):   MEDICATIONS (PRN):  --------------------------------------------------------------------------------------------    Vitals:   T(C): 36.1 (17 @ 12:44), Max: 36.1 (17 @ 12:44)  HR: 77 (17 @ 17:38) (67 - 84)  BP: 180/82 (17 @ 17:38) (175/72 - 235/117)  BP(mean): --  RR: 19 (17 @ 16:05) (19 - 19)  SpO2: 100% (17 @ 16:05) (100% - 100%)  Wt(kg): --  CAPILLARY BLOOD GLUCOSE        CAPILLARY BLOOD GLUCOSE              PHYSICAL EXAM:  General: NAD  Neuro: AAOx3, CN II-XII grossly intact  Cardio: A fib, nml S1/S2  Resp: Good effort, CTA b/l  Thorax: No chest wall tenderness  GI/Abd: Soft, NT/ND, no rebound/guarding, no masses palpated  Vascular: WWP extremities, palpable DP b/l  Musculoskeletal: All 4 extremities moving spontaneously, no limitations.  --------------------------------------------------------------------------------------------    LABS  CBC ( @ 12:53)                              12.8<L>                         7.2     )----------------(  57<L>      68.1  % Neutrophils, 19.2  % Lymphocytes, ANC: 4.9                                 37.7<L>    BMP ( @ 12:53)             141     |  102     |  20    		Ca++ --      Ca 9.7                ---------------------------------( 96    		Mg 2.2                4.3     |  26      |  1.01  			Ph --        LFTs ( @ 12:53)      TPro 7.9 / Alb 4.7 / TBili 0.7 / DBili -- / AST 28 / ALT 22 / AlkPhos 73    Coags ( @ 12:53)  aPTT 40.0<H> / INR 2.61<H> / PT 28.8<H>        VBG ( @ 13:06)     7.37 / 50 / 30 / 28 / 2.4<H> / 48<L>%     Lactate: 1.3    --------------------------------------------------------------------------------------------    MICROBIOLOGY  Urinalysis ( @ 15:00):     Color:  / Appearance: Clear / S.011 / pH: 7.0 / Gluc: Negative / Ketones: Negative / Bili: Negative / Urobili: Negative / Protein :Negative / Nitrites: Negative / Leuk.Est: Negative / RBC:  / WBC:  / Sq Epi:  / Non Sq Epi:  / Bacteria          --------------------------------------------------------------------------------------------    IMAGING  < from: CT Abdomen and Pelvis w/ IV Cont (17 @ 14:13) >  VESSELS:  Extensive atheromatous disease of the aorta and iliac vessels   with an age-indeterminate focal dissection flap in the infrarenal   abdominal aorta and left common iliac artery. 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    < end of copied text >

## 2017-09-29 NOTE — H&P ADULT - ADDITIONAL PE
T(C): 36.4 (29 Sep 2017 21:05), Max: 36.7 (29 Sep 2017 19:26) T(F): 97.6 (29 Sep 2017 21:05), Max: 98 (29 Sep 2017 19:26)  HR: 58 (29 Sep 2017 21:05) (58 - 84) BP: 119/81 (29 Sep 2017 21:05) (116/62 - 235/117)  RR: 20 (29 Sep 2017 21:05) (18 - 20) SpO2: 96% (29 Sep 2017 21:05) (96% - 100%)

## 2017-09-29 NOTE — ED ADULT NURSE REASSESSMENT NOTE - NS ED NURSE REASSESS COMMENT FT1
pt assisted to bed pan. pt cleaned, given blankets. placed in position of comfort. safety maintained. family at bedside.

## 2017-09-29 NOTE — H&P ADULT - PROBLEM SELECTOR PROBLEM 4
Closed displaced fracture of clavicle, unspecified laterality, unspecified part of clavicle, initial encounter

## 2017-09-29 NOTE — ED ADULT NURSE REASSESSMENT NOTE - NS ED NURSE REASSESS COMMENT FT1
report received from baltazar wilson. pt resting comfortably in bed. pt states "I feel fine except I have some pain in my right clavicle". laceration noted to right side of forehead, stitches placed by md frye. pt denies headache/n/v/vision changes/weakness/dizziness. color normal for race. radial pulses strong and equal bilaterally. cap refill brisk. breath sounds clear and equal bilaterally. skin warm dry and intact. as per md frye pt will be admitted. safety maintained. family at bedside. will continue to monitor.

## 2017-09-29 NOTE — ED PROVIDER NOTE - OBJECTIVE STATEMENT
86M PMH 86M PMH atrial fibrillation, HTN HLD p/w fall.  Was climbing down from the porch, got to the last of 3 steps and tangled his feet up, fell forward onto his face and R side.  No LOC.  Brought in by EMS.  Patient was alert and oriented x3, hypertensive and bradycardic.  Level 2 trauma was called prior to arrival.  Primary survey intact, secondary survey revealed large frontal hematoma, abrasion to forehead, abrasion to R arm, and R shoulder pain to palpation.  Patient denies chest pain or any precipitating symptoms to the fall.  Patient was placed in c-collar due to age.  he is on coumadin for atrial fibrillation.

## 2017-09-29 NOTE — H&P ADULT - PROBLEM SELECTOR PLAN 5
-reportedly thrombocytopenic for past 30-40 years, Plts have been stable per pt  -persistent thrombocytopenia on our records dating back to 10/2011 with Plt = 63  -counts relatively stable, no evidence of active bleeding at this time, trend CBC  -can check B12/Folate

## 2017-09-29 NOTE — ED ADULT NURSE REASSESSMENT NOTE - NS ED NURSE REASSESS COMMENT FT1
md frye aware of bp. pt aox3, mentating well. as per md frye "we want sbp to be 130, it is 121 now, but no interventions at this time. watch closely".

## 2017-09-29 NOTE — ED PROVIDER NOTE - MEDICAL DECISION MAKING DETAILS
Level 2 trauma for mechanical fall and head/right shoulder injury.  CT head, c-spine for age and anticoagulation, xray R arm, clean and dress wounds, no lacerations, f/u trauma recs.

## 2017-09-29 NOTE — H&P ADULT - HISTORY OF PRESENT ILLNESS
87yo M with PMH of AFib (on Coumadin), CAD (s/p CABG '95), HTN, HLD, severe AS, and h/o ITP presenting after mechanical fall. Patient states he tripped on the steps as he was exiting the house. He fell on his extended R hand and also hit his head. He denies LOC, palpitations, confusion. He remembers the entire episode and is currently denying any pain. He was brought in as a Level II trauma s/p evaluation. Patient states that he normally walks with a rollator outside of the house but within the house he is independent of assistive device. patient denies CP, SOB, fever, dysuria, no focal neuro deficit. Patient currently has pain with lifting RUE. Patient endorses chronic unexplained L LE swelling for past 2 years.     In the ED, patient afebrile, hypertensive to 210/100, satting well on RA. Patient was given 85yo M with PMH of AFib (on Coumadin), CAD (s/p CABG '95), HTN, HLD, severe AS, and h/o ITP presenting after mechanical fall. Patient states he tripped on the steps as he was exiting the house. He fell on his extended R hand and also hit his head. He denies LOC, palpitations, confusion. He remembers the entire episode and is currently denying any pain. Patient also denies any seizure activities or syncopal event.  He was brought in as a Level II trauma s/p evaluation. Patient states that he normally walks with a rollator outside of the house but within the house he is independent of assistive device. patient denies CP, SOB, fever, dysuria, no focal neuro deficit. Patient currently has pain with lifting RUE. Patient endorses chronic unexplained L LE swelling for past 2 years.     In the ED, patient afebrile, hypertensive to 210/100, satting well on RA. Patient was given

## 2017-09-29 NOTE — CONSULT NOTE ADULT - ASSESSMENT
86yM Level 2 Trauma activation s/p [facility] fall    - f/u CT final read  - [place] J collar may be removed if CT C-spine is negative for fracture and Pt is w/o pain  - If no acute traumatic process, Pt may be admitted to medicine service for BP control vs. d/c home if hemodynamically stable and able to ambulate  - Discussed w/ Dr. Dumont 86yM Level 2 Trauma activation s/p [facility] fall    - CT showed no acute traumatic injury to head, C-spine, chest, abdomen, or pelvis  - [place] J collar may be removed after clinical evaluation  - Pt may be admitted to medicine service for BP control vs. d/c home if hemodynamically stable and able to ambulate  - Discussed w/ Dr. Dumont 86yM Level 2 Trauma activation s/p [facility] fall    - CT showed no acute traumatic injury to head, C-spine, chest, abdomen, or pelvis  - [place] J collar may be removed after clinical evaluation  - Ortho consult for distal clavicular fxr  - Pt may be admitted to medicine service for BP control vs. d/c home if hemodynamically stable and able to ambulate  - Discussed w/ Dr. Dumont

## 2017-09-29 NOTE — H&P ADULT - NSHPLABSRESULTS_GEN_ALL_CORE
LABS: Personally Read and Interpreted                          12.8   7.2   )-----------( 57       ( 29 Sep 2017 12:53 )             37.7     PT/INR - ( 29 Sep 2017 12:53 )   PT: 28.8 sec;   INR: 2.61 ratio         PTT - ( 29 Sep 2017 12:53 )  PTT:40.0 sec        141  |  102  |  20  ----------------------------<  96  4.3   |  26  |  1.01    Ca    9.7      29 Sep 2017 12:53  Mg     2.2         TPro  7.9  /  Alb  4.7  /  TBili  0.7  /  DBili  x   /  AST  28  /  ALT  22  /  AlkPhos  73        Urinalysis Basic - ( 29 Sep 2017 15:00 )  Color: x / Appearance: Clear / S.011 / pH: x  Gluc: x / Ketone: Negative  / Bili: Negative / Urobili: Negative   Blood: x / Protein: Negative / Nitrite: Negative   Leuk Esterase: Negative / RBC: x / WBC x   Sq Epi: x / Non Sq Epi: x / Bacteria: x        EKG: tracing personally read and reviewed; AFib @73bpm with PVCs    IMAGING: personally read and reviewed LABS: Personally Read and Interpreted                          12.8   7.2   )-----------( 57       ( 29 Sep 2017 12:53 )             37.7     PT/INR - ( 29 Sep 2017 12:53 )   PT: 28.8 sec;   INR: 2.61 ratio         PTT - ( 29 Sep 2017 12:53 )  PTT:40.0 sec        141  |  102  |  20  ----------------------------<  96  4.3   |  26  |  1.01    Ca    9.7      29 Sep 2017 12:53  Mg     2.2         TPro  7.9  /  Alb  4.7  /  TBili  0.7  /  DBili  x   /  AST  28  /  ALT  22  /  AlkPhos  73        Urinalysis Basic - ( 29 Sep 2017 15:00 )  Color: x / Appearance: Clear / S.011 / pH: x  Gluc: x / Ketone: Negative  / Bili: Negative / Urobili: Negative   Blood: x / Protein: Negative / Nitrite: Negative   Leuk Esterase: Negative / RBC: x / WBC x   Sq Epi: x / Non Sq Epi: x / Bacteria: x        EKG: tracing personally read and reviewed; AFib @73bpm with PVCs    IMAGING: personally read and reviewed  XR of Humerus/Elbow/Forearm/Wrist/Hand: minimally displaced distal clavicle fracture extending into the right AC joint. No additional fracture or dislocation in RUE. Degenerative changes noted    CT Head/Cervical Spine: No acute intracranial hemorrhage or mass effect. No acute cervical fracture or traumatic subluxation    CT Chest/Abdomen/Pelvis: Cardiomegaly, no PE. Hepatic cysts and colonic diverticulosis. Age-Indeterminate focal dissection involving infrarenal aorta and left common iliac artery.

## 2017-09-29 NOTE — H&P ADULT - ASSESSMENT
87yo M with PMH of AFib (on Coumadin), CAD (s/p CABG '95), HTN, HLD, severe AS, and h/o ITP presenting after mechanical fall found to be hypertensive with incidental chronic aortoiliac dissection.

## 2017-09-29 NOTE — H&P ADULT - PROBLEM SELECTOR PLAN 4
-acute minimally displaced fracture noted on XR  -Ortho c/s, likely non-operative management with sling and outpatient follow-up -acute minimally displaced fracture noted on XR  -pain control with Tylenol  -Ortho c/s, likely non-operative management with sling and outpatient follow-up

## 2017-09-29 NOTE — ED PROVIDER NOTE - PROGRESS NOTE DETAILS
Gave 5 mg hydralazine every 1.5 hours for goal of <140 SBP, with PO 10mg given.  Patient had SBP >170 until 1830 at which time his BP was measured at 120/55.  Patient is mentating well, no complaints.  Will monitor closely.  Jackeline Rodriguez, PGY3

## 2017-09-29 NOTE — CONSULT NOTE ADULT - ASSESSMENT
ASSESSMENT: Patient is a 86y old m with chronic aortoiliac dissection flap    PLAN:   - BLE arterial duplex to assess if flap extends distally  - Medical admission and optimization w/ BP control  - Plan Discussed with Fellow, Dr. Aleman  - Please page [phone] w/ any questions ASSESSMENT: Patient is a 86y old m with chronic aortoiliac dissection flap extending into the left iliac arterial system  currently asymptomatic     PLAN:   - BLE arterial duplex to assess if flap extends distally  - Medical admission and optimization w/ BP control  - Plan Discussed with Fellow, Dr. Aleman  - Please page [phone] w/ any questions

## 2017-09-29 NOTE — H&P ADULT - PROBLEM SELECTOR PLAN 3
-mechanical fall, low suspicion for syncope  -CT Head without acute bleed (on Coumadin)  -skeletal XR revealing acute clavicle fracture -> Ortho c/s  -c/w Tele monitoring  -PT eval  -evaluated and cleared by Trauma

## 2017-09-29 NOTE — H&P ADULT - PROBLEM SELECTOR PLAN 9
-DVT PPX: already anticoagulated on Coumadin        Mariano Andrade M.D.  Medicine Resident, PGY-2  Pager: [phone]

## 2017-09-29 NOTE — ED PROVIDER NOTE - PMH
Atrial fibrillation    Hyperlipidemia    Hypertension Aortic stenosis    Atrial fibrillation    Chronic ITP (idiopathic thrombocytopenic purpura)    Coronary artery disease    Hyperlipidemia    Hypertension

## 2017-09-30 DIAGNOSIS — I71.02 DISSECTION OF ABDOMINAL AORTA: ICD-10-CM

## 2017-09-30 DIAGNOSIS — I77.72 DISSECTION OF ILIAC ARTERY: ICD-10-CM

## 2017-09-30 DIAGNOSIS — I48.2 CHRONIC ATRIAL FIBRILLATION: ICD-10-CM

## 2017-09-30 LAB
ANION GAP SERPL CALC-SCNC: 19 MMOL/L — HIGH (ref 5–17)
APTT BLD: 40 SEC — HIGH (ref 27.5–37.4)
BASOPHILS # BLD AUTO: 0.03 K/UL — SIGNIFICANT CHANGE UP (ref 0–0.2)
BASOPHILS NFR BLD AUTO: 0.4 % — SIGNIFICANT CHANGE UP (ref 0–2)
BUN SERPL-MCNC: 21 MG/DL — SIGNIFICANT CHANGE UP (ref 7–23)
CALCIUM SERPL-MCNC: 9 MG/DL — SIGNIFICANT CHANGE UP (ref 8.4–10.5)
CHLORIDE SERPL-SCNC: 107 MMOL/L — SIGNIFICANT CHANGE UP (ref 96–108)
CO2 SERPL-SCNC: 19 MMOL/L — LOW (ref 22–31)
CREAT SERPL-MCNC: 1.04 MG/DL — SIGNIFICANT CHANGE UP (ref 0.5–1.3)
EOSINOPHIL # BLD AUTO: 0.1 K/UL — SIGNIFICANT CHANGE UP (ref 0–0.5)
EOSINOPHIL NFR BLD AUTO: 1.2 % — SIGNIFICANT CHANGE UP (ref 0–6)
FOLATE SERPL-MCNC: 10 NG/ML — SIGNIFICANT CHANGE UP (ref 4.8–24.2)
GLUCOSE SERPL-MCNC: 96 MG/DL — SIGNIFICANT CHANGE UP (ref 70–99)
HCT VFR BLD CALC: 33.9 % — LOW (ref 39–50)
HGB BLD-MCNC: 11.7 G/DL — LOW (ref 13–17)
IMM GRANULOCYTES NFR BLD AUTO: 0.2 % — SIGNIFICANT CHANGE UP (ref 0–1.5)
INR BLD: 2.4 RATIO — HIGH (ref 0.88–1.16)
LYMPHOCYTES # BLD AUTO: 1.22 K/UL — SIGNIFICANT CHANGE UP (ref 1–3.3)
LYMPHOCYTES # BLD AUTO: 14.3 % — SIGNIFICANT CHANGE UP (ref 13–44)
MAGNESIUM SERPL-MCNC: 2.2 MG/DL — SIGNIFICANT CHANGE UP (ref 1.6–2.6)
MCHC RBC-ENTMCNC: 32.1 PG — SIGNIFICANT CHANGE UP (ref 27–34)
MCHC RBC-ENTMCNC: 34.5 GM/DL — SIGNIFICANT CHANGE UP (ref 32–36)
MCV RBC AUTO: 92.9 FL — SIGNIFICANT CHANGE UP (ref 80–100)
MONOCYTES # BLD AUTO: 1.04 K/UL — HIGH (ref 0–0.9)
MONOCYTES NFR BLD AUTO: 12.2 % — SIGNIFICANT CHANGE UP (ref 2–14)
NEUTROPHILS # BLD AUTO: 6.1 K/UL — SIGNIFICANT CHANGE UP (ref 1.8–7.4)
NEUTROPHILS NFR BLD AUTO: 71.7 % — SIGNIFICANT CHANGE UP (ref 43–77)
PHOSPHATE SERPL-MCNC: 2.6 MG/DL — SIGNIFICANT CHANGE UP (ref 2.5–4.5)
PLATELET # BLD AUTO: 69 K/UL — LOW (ref 150–400)
POTASSIUM SERPL-MCNC: 3.9 MMOL/L — SIGNIFICANT CHANGE UP (ref 3.5–5.3)
POTASSIUM SERPL-SCNC: 3.9 MMOL/L — SIGNIFICANT CHANGE UP (ref 3.5–5.3)
PROTHROM AB SERPL-ACNC: 27.6 SEC — HIGH (ref 10–13.1)
RBC # BLD: 3.65 M/UL — LOW (ref 4.2–5.8)
RBC # FLD: 14.7 % — HIGH (ref 10.3–14.5)
SODIUM SERPL-SCNC: 145 MMOL/L — SIGNIFICANT CHANGE UP (ref 135–145)
VIT B12 SERPL-MCNC: 405 PG/ML — SIGNIFICANT CHANGE UP (ref 243–894)
WBC # BLD: 8.51 K/UL — SIGNIFICANT CHANGE UP (ref 3.8–10.5)
WBC # FLD AUTO: 8.51 K/UL — SIGNIFICANT CHANGE UP (ref 3.8–10.5)

## 2017-09-30 PROCEDURE — 99232 SBSQ HOSP IP/OBS MODERATE 35: CPT

## 2017-09-30 PROCEDURE — 99233 SBSQ HOSP IP/OBS HIGH 50: CPT

## 2017-09-30 RX ORDER — INFLUENZA VIRUS VACCINE 15; 15; 15; 15 UG/.5ML; UG/.5ML; UG/.5ML; UG/.5ML
0.5 SUSPENSION INTRAMUSCULAR ONCE
Qty: 0 | Refills: 0 | Status: DISCONTINUED | OUTPATIENT
Start: 2017-09-30 | End: 2017-10-03

## 2017-09-30 RX ORDER — HYDRALAZINE HCL 50 MG
5 TABLET ORAL ONCE
Qty: 0 | Refills: 0 | Status: COMPLETED | OUTPATIENT
Start: 2017-09-30 | End: 2017-09-30

## 2017-09-30 RX ORDER — HYDRALAZINE HCL 50 MG
25 TABLET ORAL EVERY 8 HOURS
Qty: 0 | Refills: 0 | Status: DISCONTINUED | OUTPATIENT
Start: 2017-09-30 | End: 2017-10-01

## 2017-09-30 RX ORDER — HYDRALAZINE HCL 50 MG
10 TABLET ORAL ONCE
Qty: 0 | Refills: 0 | Status: COMPLETED | OUTPATIENT
Start: 2017-09-30 | End: 2017-09-30

## 2017-09-30 RX ADMIN — ATORVASTATIN CALCIUM 10 MILLIGRAM(S): 80 TABLET, FILM COATED ORAL at 20:52

## 2017-09-30 RX ADMIN — TAMSULOSIN HYDROCHLORIDE 0.4 MILLIGRAM(S): 0.4 CAPSULE ORAL at 20:52

## 2017-09-30 RX ADMIN — Medication 650 MILLIGRAM(S): at 20:00

## 2017-09-30 RX ADMIN — FINASTERIDE 5 MILLIGRAM(S): 5 TABLET, FILM COATED ORAL at 12:19

## 2017-09-30 RX ADMIN — Medication 10 MILLIEQUIVALENT(S): at 12:19

## 2017-09-30 RX ADMIN — Medication 10 MILLIGRAM(S): at 23:14

## 2017-09-30 RX ADMIN — Medication 30 MILLIGRAM(S): at 12:19

## 2017-09-30 RX ADMIN — Medication 650 MILLIGRAM(S): at 21:00

## 2017-09-30 RX ADMIN — Medication 25 MILLIGRAM(S): at 20:52

## 2017-09-30 RX ADMIN — Medication 20 MILLIGRAM(S): at 07:21

## 2017-09-30 RX ADMIN — LISINOPRIL 40 MILLIGRAM(S): 2.5 TABLET ORAL at 07:22

## 2017-09-30 RX ADMIN — Medication 5 MILLIGRAM(S): at 15:14

## 2017-09-30 NOTE — PROGRESS NOTE ADULT - PROBLEM SELECTOR PLAN 1
Pain control with Tylenol.  Ortho eval appreciated.  Continue with sling, with outpatient follow up.  Other trauma workup is negative.

## 2017-09-30 NOTE — PROGRESS NOTE ADULT - ASSESSMENT
ASSESSMENT: Patient is a 86y old m with chronic aortoiliac dissection flap extending into the left iliac arterial system  currently asymptomatic     PLAN:   no inducation for any vascular surgery intervention at this time   will need  abdominal aorta and lt iliac artery dissection surveillance in  6mo as outpt  above d/w pt and daughter   f/u as outpt  reconsult prn

## 2017-09-30 NOTE — CHART NOTE - NSCHARTNOTEFT_GEN_A_CORE
Received a call from Dr. Sandhu, Cardiologist regarding pt's BP. Pt's BP continued to be in SBP 170s. As per Dr. Sandhu's recommendation to increase hydralazine to 25mg TID and to hold if SBP<120.   Medication changed. Will continue to monitor.    Josie Woods NP [phone]

## 2017-09-30 NOTE — PROGRESS NOTE ADULT - ASSESSMENT
86 M with numerous comorbidities, including 87yo M with PMH of AFib (on Coumadin), CAD (s/p CABG '95), HTN, HLD, severe AS, and h/o ITP presenting after mechanical fall found to be hypertensive with incidental chronic aortoiliac dissection, as well as right clavicular fracture.

## 2017-09-30 NOTE — PROGRESS NOTE ADULT - SUBJECTIVE AND OBJECTIVE BOX
TERTIARY TRAUMA SURVEY  ------------------------------------------------------------------------------------------    Date of TTS: 17  Admit Date:   Trauma Activation: II  Admit GCS:  15     HPI:  85yo M with PMH of AFib (on Coumadin), CAD (s/p CABG '95), HTN, HLD, severe AS, and h/o ITP presenting after mechanical fall. Patient states he tripped on the steps as he was exiting the house. He fell on his extended R hand and also hit his head. Found to have right distal clavicular fracture and infrarenal AA dissection          INTERVAL EVENTS:  Vascular consult recommended BP control and duplex      PAST MEDICAL & SURGICAL HISTORY:  Aortic stenosis  Chronic ITP (idiopathic thrombocytopenic purpura)  Coronary artery disease  Atrial fibrillation  Hyperlipidemia  Hypertension  S/P CABG (coronary artery bypass graft)    FAMILY HISTORY:  No pertinent family history in first degree relatives        ALLERGIES: No Known Allergies      CURRENT MEDICATIONS:   MEDICATIONS (STANDING): finasteride 5 milliGRAM(s) Oral daily  lisinopril 40 milliGRAM(s) Oral daily  tamsulosin 0.4 milliGRAM(s) Oral at bedtime  PARoxetine 30 milliGRAM(s) Oral daily  atorvastatin 10 milliGRAM(s) Oral at bedtime  furosemide    Tablet 20 milliGRAM(s) Oral daily  potassium chloride    Tablet ER 10 milliEquivalent(s) Oral daily    MEDICATIONS (PRN):acetaminophen   Tablet. 650 milliGRAM(s) Oral every 6 hours PRN mild/moderate pain  hydrALAZINE 10 milliGRAM(s) Oral every 8 hours PRN Systolic blood pressure > 170    ------------------------------------------------------------------------------------------    VITAL SIGNS  T(C): 36.8 (17 @ 04:20), Max: 36.8 (17 @ 04:20)  HR: 75 (17 @ 07:12) (56 - 84)  BP: 177/83 (17 @ 07:12) (116/62 - 235/117)  BP(mean): --  RR: 20 (17 @ 04:20) (18 - 20)  SpO2: 96% (17 @ 04:20) (96% - 100%)  Wt(kg): --  CAPILLARY BLOOD GLUCOSE          INS/OUTS:     @ 07:01  -   @ 12:49  --------------------------------------------------------  IN:    Oral Fluid: 240 mL  Total IN: 240 mL    OUT:  Total OUT: 0 mL    Total NET: 240 mL      PHYSICAL EXAM:   General: NAD, Sitting in bed comfortably  HEENT: EOMI, abrasion to right forehead with dressing in place, hemostatic and C/D/I  Neck: Soft, supple  Resp: No respiratory dyspnea  Thorax: No chest wall tenderness  GI/Abd: Soft, NT/ND, no rebound/guarding, no masses palpated  Vascular: Extremities warm, brisk cap refill, B/l radial pulses palpable,   Skin: Abrasion on right forearm, scattered abrasions on distal fingertips  Lymphatic/Nodes: No palpable lymphadenopathy  Musculoskeletal: All 4 extremities moving, SILT, tenderness over right clavicle with right arm in sling  ------------------------------------------------------------------------------------------    LABS  CBC ( @ 08:45)                              11.7<L>                         8.51    )----------------(  69<L>      71.7  % Neutrophils, 14.3  % Lymphocytes, ANC: 6.10                                33.9<L>  CBC ( @ 12:53)                              12.8<L>                         7.2     )----------------(  57<L>      68.1  % Neutrophils, 19.2  % Lymphocytes, ANC: 4.9                                 37.7<L>    BMP ( @ 08:56)             145     |  107     |  21    		Ca++ --      Ca 9.0                ---------------------------------( 96    		Mg 2.2                3.9     |  19<L>   |  1.04  			Ph 2.6     BMP ( @ 12:53)             141     |  102     |  20    		Ca++ --      Ca 9.7                ---------------------------------( 96    		Mg 2.2                4.3     |  26      |  1.01  			Ph --        LFTs ( @ 12:53)      TPro 7.9 / Alb 4.7 / TBili 0.7 / DBili -- / AST 28 / ALT 22 / AlkPhos 73    Coags ( @ 08:45)  aPTT 40.0<H> / INR 2.40<H> / PT 27.6<H>  Coags ( @ 12:53)  aPTT 40.0<H> / INR 2.61<H> / PT 28.8<H>        VBG ( @ 13:06)     7.37 / 50 / 30 / 28 / 2.4<H> / 48<L>%     Lactate: 1.3      MICROBIOLOGY  Urinalysis ( @ 15:00):     Color:  / Appearance: Clear / S.011 / pH: 7.0 / Gluc: Negative / Ketones: Negative / Bili: Negative / Urobili: Negative / Protein :Negative / Nitrites: Negative / Leuk.Est: Negative / RBC:  / WBC:  / Sq Epi:  / Non Sq Epi:  / Bacteria          ------------------------------------------------------------------------------------------    RADIOLOGICAL FINDINGS REVIEW:    CXR: Negative  Pelvis Films: Negative    Extremity Films: Right shoulder Minimally displaced intra-articular distal clavicle fracture.  RUE No additional fracture or dislocation is identified in the right upper extremity.  CT Head: No acute intracranial hemorrhage or mass effect. No displaced calvarial fracture.    CT Cervical Spine: No acute fracture or traumatic subluxation. No prevertebral soft tissue swelling. Degenerative changes  Chest/ABD/Pelvis CT: Extensive atherosclerosis with an age-indeterminate focal dissection flap in  the infrarenal abdominal aorta and left common iliac artery.      Other:     List Injuries Identified to Date:    Right distal clavicular fracture  Dissection of abdominal aorta (non traumatic, discovered incidentally)      List Operative and Interventional Radiological Procedures:  None      Consults (Date):    [] Neurosurgery   [X] Vascular Surgery  [] Orthopedic Surgery  [] Spine Surgery  [] Plastic Surgery  [] ENT  [] Urology  [] PM&R  [] Social Work    INTERPRETATION:   86M s/p fall with distal clavicular fracture and incidentally discovered infrarenal AA dissection

## 2017-09-30 NOTE — PROGRESS NOTE ADULT - SUBJECTIVE AND OBJECTIVE BOX
Patient is a 86y old  Male who presents with a chief complaint of Fall (29 Sep 2017 22:32)      SUBJECTIVE / OVERNIGHT EVENTS:  Reports no acute events overnight.  Still with some discomfort around clavicle.  Improved with sling.  All other ROS are negative.    MEDICATIONS  (STANDING):  finasteride 5 milliGRAM(s) Oral daily  lisinopril 40 milliGRAM(s) Oral daily  tamsulosin 0.4 milliGRAM(s) Oral at bedtime  PARoxetine 30 milliGRAM(s) Oral daily  atorvastatin 10 milliGRAM(s) Oral at bedtime  furosemide    Tablet 20 milliGRAM(s) Oral daily  potassium chloride    Tablet ER 10 milliEquivalent(s) Oral daily    MEDICATIONS  (PRN):  acetaminophen   Tablet. 650 milliGRAM(s) Oral every 6 hours PRN mild/moderate pain  hydrALAZINE 10 milliGRAM(s) Oral every 8 hours PRN Systolic blood pressure > 170        CAPILLARY BLOOD GLUCOSE        I&O's Summary    30 Sep 2017 07:01  -  30 Sep 2017 09:51  --------------------------------------------------------  IN: 240 mL / OUT: 0 mL / NET: 240 mL        PHYSICAL EXAM:  GENERAL: NAD, well-developed  HEAD:  bandage on right side of head. Normocephalic  EYES: EOMI, PERRLA, conjunctiva and sclera clear  NECK: Supple, No JVD  CHEST/LUNG: Clear to auscultation bilaterally; No wheeze  HEART: Regular rate and rhythm; No murmurs, rubs, or gallops  ABDOMEN: Soft, Nontender, Nondistended; Bowel sounds present  EXTREMITIES:  2+ Peripheral Pulses, No clubbing, cyanosis, or edema, right sling.   PSYCH: AAOx3  NEUROLOGY: non-focal  SKIN: No rashes or lesions    LABS:                        12.8   7.2   )-----------( 57       ( 29 Sep 2017 12:53 )             37.7         141  |  102  |  20  ----------------------------<  96  4.3   |  26  |  1.01    Ca    9.7      29 Sep 2017 12:53  Mg     2.2         TPro  7.9  /  Alb  4.7  /  TBili  0.7  /  DBili  x   /  AST  28  /  ALT  22  /  AlkPhos  73      PT/INR - ( 30 Sep 2017 08:45 )   PT: 27.6 sec;   INR: 2.40 ratio         PTT - ( 30 Sep 2017 08:45 )  PTT:40.0 sec      Urinalysis Basic - ( 29 Sep 2017 15:00 )    Color: x / Appearance: Clear / S.011 / pH: x  Gluc: x / Ketone: Negative  / Bili: Negative / Urobili: Negative   Blood: x / Protein: Negative / Nitrite: Negative   Leuk Esterase: Negative / RBC: x / WBC x   Sq Epi: x / Non Sq Epi: x / Bacteria: x        RADIOLOGY & ADDITIONAL TESTS:    Imaging Personally Reviewed:    Consultant(s) Notes Reviewed:      Care Discussed with Consultants/Other Providers:

## 2017-09-30 NOTE — PROGRESS NOTE ADULT - SUBJECTIVE AND OBJECTIVE BOX
Patient is a 86y old  Male who presents with a chief complaint of Fall (29 Sep 2017 22:32)      Vascular Surgery Attending Progress Note    Interval HPI:     Medications:  finasteride 5 milliGRAM(s) Oral daily  lisinopril 40 milliGRAM(s) Oral daily  tamsulosin 0.4 milliGRAM(s) Oral at bedtime  PARoxetine 30 milliGRAM(s) Oral daily  atorvastatin 10 milliGRAM(s) Oral at bedtime  furosemide    Tablet 20 milliGRAM(s) Oral daily  potassium chloride    Tablet ER 10 milliEquivalent(s) Oral daily  acetaminophen   Tablet. 650 milliGRAM(s) Oral every 6 hours PRN  hydrALAZINE 10 milliGRAM(s) Oral every 8 hours PRN      Vital Signs Last 24 Hrs  T(C): 36.8 (30 Sep 2017 13:03), Max: 36.8 (30 Sep 2017 04:20)  T(F): 98.2 (30 Sep 2017 13:03), Max: 98.3 (30 Sep 2017 04:20)  HR: 62 (30 Sep 2017 13:03) (56 - 77)  BP: 182/73 (30 Sep 2017 13:03) (116/62 - 182/73)  BP(mean): --  RR: 18 (30 Sep 2017 13:03) (18 - 20)  SpO2: 94% (30 Sep 2017 13:03) (94% - 98%)  I&O's Summary    30 Sep 2017 07:01  -  30 Sep 2017 16:11  --------------------------------------------------------  IN: 540 mL / OUT: 0 mL / NET: 540 mL        Physical Exam:  Neuro  A&Ox3 VSS  CV:   Lungs:  Vascular:    Pulses  femoral   rt    +12       lt   +12                 popliteal  rt   +12        lt  +12                DPA          rt   +12        lt  +12                PTA          rt   +12        lt  +12   Extremity:   Musculoskeletal:    LABS:                        11.7   8.51  )-----------( 69       ( 30 Sep 2017 08:45 )             33.9     09-30    145  |  107  |  21  ----------------------------<  96  3.9   |  19<L>  |  1.04    Ca    9.0      30 Sep 2017 08:56  Phos  2.6     09-30  Mg     2.2     09-30    TPro  7.9  /  Alb  4.7  /  TBili  0.7  /  DBili  x   /  AST  28  /  ALT  22  /  AlkPhos  73  09-29    PT/INR - ( 30 Sep 2017 08:45 )   PT: 27.6 sec;   INR: 2.40 ratio         PTT - ( 30 Sep 2017 08:45 )  PTT:40.0 sec    WALDO BARROW MD  [phone] Patient is a 86y old  Male who presents with a chief complaint of Fall (29 Sep 2017 22:32)      Vascular Surgery Attending Progress Note    Interval HPI: pt w/o c/o     Medications:  finasteride 5 milliGRAM(s) Oral daily  lisinopril 40 milliGRAM(s) Oral daily  tamsulosin 0.4 milliGRAM(s) Oral at bedtime  PARoxetine 30 milliGRAM(s) Oral daily  atorvastatin 10 milliGRAM(s) Oral at bedtime  furosemide    Tablet 20 milliGRAM(s) Oral daily  potassium chloride    Tablet ER 10 milliEquivalent(s) Oral daily  acetaminophen   Tablet. 650 milliGRAM(s) Oral every 6 hours PRN  hydrALAZINE 10 milliGRAM(s) Oral every 8 hours PRN      Vital Signs Last 24 Hrs  T(C): 36.8 (30 Sep 2017 13:03), Max: 36.8 (30 Sep 2017 04:20)  T(F): 98.2 (30 Sep 2017 13:03), Max: 98.3 (30 Sep 2017 04:20)  HR: 62 (30 Sep 2017 13:03) (56 - 77)  BP: 182/73 (30 Sep 2017 13:03) (116/62 - 182/73)  BP(mean): --  RR: 18 (30 Sep 2017 13:03) (18 - 20)  SpO2: 94% (30 Sep 2017 13:03) (94% - 98%)  I&O's Summary    30 Sep 2017 07:01  -  30 Sep 2017 16:11  --------------------------------------------------------  IN: 540 mL / OUT: 0 mL / NET: 540 mL        Physical Exam:  Neuro  A&Ox3 VSS  Abd soft nt nd   Vascular:    Pulses  femoral   rt    +2       lt   +2               DPA          rt   +2        lt  +2                PTA          rt   +2        lt  +2   Extremity: wnl warm well perfused krissy   Musculoskeletal:wnl    LABS:                        11.7   8.51  )-----------( 69       ( 30 Sep 2017 08:45 )             33.9     09-30    145  |  107  |  21  ----------------------------<  96  3.9   |  19<L>  |  1.04    Ca    9.0      30 Sep 2017 08:56  Phos  2.6     09-30  Mg     2.2     09-30    TPro  7.9  /  Alb  4.7  /  TBili  0.7  /  DBili  x   /  AST  28  /  ALT  22  /  AlkPhos  73  09-29    PT/INR - ( 30 Sep 2017 08:45 )   PT: 27.6 sec;   INR: 2.40 ratio         PTT - ( 30 Sep 2017 08:45 )  PTT:40.0 sec    WALDO BARROW MD  [phone]

## 2017-09-30 NOTE — PROGRESS NOTE ADULT - PROBLEM SELECTOR PLAN 2
Patient is awaiting [facility] Arterial Duplex of lower extremities to evaluate extent of dissection.  Vascular eval appreciated.

## 2017-10-01 DIAGNOSIS — D72.829 ELEVATED WHITE BLOOD CELL COUNT, UNSPECIFIED: ICD-10-CM

## 2017-10-01 LAB
ANION GAP SERPL CALC-SCNC: 14 MMOL/L — SIGNIFICANT CHANGE UP (ref 5–17)
APTT BLD: 36.2 SEC — SIGNIFICANT CHANGE UP (ref 27.5–37.4)
BUN SERPL-MCNC: 17 MG/DL — SIGNIFICANT CHANGE UP (ref 7–23)
CALCIUM SERPL-MCNC: 8.9 MG/DL — SIGNIFICANT CHANGE UP (ref 8.4–10.5)
CHLORIDE SERPL-SCNC: 102 MMOL/L — SIGNIFICANT CHANGE UP (ref 96–108)
CO2 SERPL-SCNC: 21 MMOL/L — LOW (ref 22–31)
CREAT SERPL-MCNC: 0.88 MG/DL — SIGNIFICANT CHANGE UP (ref 0.5–1.3)
GLUCOSE SERPL-MCNC: 123 MG/DL — HIGH (ref 70–99)
HCT VFR BLD CALC: 35.6 % — LOW (ref 39–50)
HGB BLD-MCNC: 12.3 G/DL — LOW (ref 13–17)
INR BLD: 1.96 RATIO — HIGH (ref 0.88–1.16)
MCHC RBC-ENTMCNC: 32 PG — SIGNIFICANT CHANGE UP (ref 27–34)
MCHC RBC-ENTMCNC: 34.6 GM/DL — SIGNIFICANT CHANGE UP (ref 32–36)
MCV RBC AUTO: 92.7 FL — SIGNIFICANT CHANGE UP (ref 80–100)
PLATELET # BLD AUTO: 70 K/UL — LOW (ref 150–400)
POTASSIUM SERPL-MCNC: 3.6 MMOL/L — SIGNIFICANT CHANGE UP (ref 3.5–5.3)
POTASSIUM SERPL-SCNC: 3.6 MMOL/L — SIGNIFICANT CHANGE UP (ref 3.5–5.3)
PROTHROM AB SERPL-ACNC: 22.4 SEC — HIGH (ref 10–13.1)
RBC # BLD: 3.84 M/UL — LOW (ref 4.2–5.8)
RBC # FLD: 15 % — HIGH (ref 10.3–14.5)
SODIUM SERPL-SCNC: 137 MMOL/L — SIGNIFICANT CHANGE UP (ref 135–145)
WBC # BLD: 15.04 K/UL — HIGH (ref 3.8–10.5)
WBC # FLD AUTO: 15.04 K/UL — HIGH (ref 3.8–10.5)

## 2017-10-01 PROCEDURE — 99223 1ST HOSP IP/OBS HIGH 75: CPT

## 2017-10-01 PROCEDURE — 99233 SBSQ HOSP IP/OBS HIGH 50: CPT

## 2017-10-01 RX ORDER — HYDRALAZINE HCL 50 MG
50 TABLET ORAL EVERY 8 HOURS
Qty: 0 | Refills: 0 | Status: DISCONTINUED | OUTPATIENT
Start: 2017-10-01 | End: 2017-10-03

## 2017-10-01 RX ORDER — WARFARIN SODIUM 2.5 MG/1
5 TABLET ORAL ONCE
Qty: 0 | Refills: 0 | Status: COMPLETED | OUTPATIENT
Start: 2017-10-01 | End: 2017-10-01

## 2017-10-01 RX ADMIN — Medication 10 MILLIEQUIVALENT(S): at 11:36

## 2017-10-01 RX ADMIN — ATORVASTATIN CALCIUM 10 MILLIGRAM(S): 80 TABLET, FILM COATED ORAL at 21:29

## 2017-10-01 RX ADMIN — FINASTERIDE 5 MILLIGRAM(S): 5 TABLET, FILM COATED ORAL at 11:36

## 2017-10-01 RX ADMIN — Medication 30 MILLIGRAM(S): at 14:15

## 2017-10-01 RX ADMIN — Medication 25 MILLIGRAM(S): at 05:19

## 2017-10-01 RX ADMIN — Medication 20 MILLIGRAM(S): at 05:19

## 2017-10-01 RX ADMIN — LISINOPRIL 40 MILLIGRAM(S): 2.5 TABLET ORAL at 05:19

## 2017-10-01 RX ADMIN — Medication 50 MILLIGRAM(S): at 14:16

## 2017-10-01 RX ADMIN — TAMSULOSIN HYDROCHLORIDE 0.4 MILLIGRAM(S): 0.4 CAPSULE ORAL at 21:29

## 2017-10-01 RX ADMIN — WARFARIN SODIUM 5 MILLIGRAM(S): 2.5 TABLET ORAL at 21:29

## 2017-10-01 RX ADMIN — Medication 50 MILLIGRAM(S): at 21:29

## 2017-10-01 NOTE — CONSULT NOTE ADULT - ASSESSMENT
· Assessment		  85yo M with PMH of AFib (on Coumadin), CAD (s/p CABG '95), HTN, HLD, severe AS, and h/o ITP presenting after mechanical fall found to be hypertensive with incidental chronic aortoiliac dissection, as well as right clavicular fracture.    Problem/Plan - 1:  ·  Problem: Closed displaced fracture of clavicle, unspecified laterality, unspecified part of clavicle, initial encounter.  Plan: Pain control with Tylenol.  Trauma eval appreciated.  Will obtain ortho consult, to facilitate outpatient follow up.  Continue with sling, with outpatient follow up.  Other trauma workup is negative.     Problem/Plan - 2:  ·  Problem: Dissection of abdominal aorta.  Plan: Patient is awaiting VA Arterial Duplex of lower extremities to evaluate extent of dissection.  Vascular eval appreciated.  Likely outpatient follow up.     Problem/Plan - 3:  ·  Problem: Hypertensive urgency.  Plan: Patient with very brittle blood pressure, likely related to underlying extensive atherosclerosis.   Continue with Lisinopril and Hydralazine.  Hydralazine dose increased today to 50mg TID.  Will monitor.     Problem/Plan - 4:  ·  Problem: Chronic ITP (idiopathic thrombocytopenic purpura).  Plan: Platelets are stable.  Monitor.     Problem/Plan - 5:  ·  Problem: Chronic atrial fibrillation.  Plan: Rate controlled.  Continue with Coumadin. 5mg tonight.  Patient did not receive dose yesterday.     Problem/Plan - 6:  Problem: Leukocytosis, unspecified type. Plan: Unclear etiology.  Likely reactive.  Patient is non toxic.  No evidence of sepsis or signs of infection.  Monitor. If fever, then obtain cultures. Off antibiotics. Assessment		  85yo M with PMH of AFib (on Coumadin), CAD (s/p CABG '95), HTN, HLD, severe AS  Presented after mechanical fall found to be hypertensive; X Rays show right clavicular fracture and likely incidental and chronic aorto-iliac dissection.     Problem/Plan - 1:  ·  Problem: Hypertensive urgency.   Continue with Lisinopril and Hydralazine; hydralazine dose increased today to 50mg TID; continue Lasix.  Continue to monitor BP.     Problem/Plan - 2:  ·  Problem: Dissection of abdominal aorta; likely chronic.    Plan: Patient is awaiting VA Arterial Duplex of lower extremities to evaluate extent of dissection.    Problem/Plan - 3:  ·  Problem: Chronic atrial fibrillation.    Rate controlled on current meds  Continue with Coumadin; monitor INR    Problem/Plan - 4:  Chronic valvular heart disease, with severe AS and moderate AI; moderate MR; remains asymptomatic with compensated cardiac status.    Problem/Plan - 5:  Hyperlipidemia - continue atorvastatin    Jeremiah Sandhu M.D.  Office: [phone]  Beeper: [phone]

## 2017-10-01 NOTE — PROGRESS NOTE ADULT - PROBLEM SELECTOR PLAN 6
Unclear etiology.  Likely reactive.  Patient is non toxic.  No evidence of sepsis or signs of infection.  Monitor. If fever, then obtain cultures. Off antibiotics.

## 2017-10-01 NOTE — PROGRESS NOTE ADULT - PROBLEM SELECTOR PLAN 1
Pain control with Tylenol.  Trauma eval appreciated.  Will obtain ortho consult, to facilitate outpatient follow up.  Continue with sling, with outpatient follow up.  Other trauma workup is negative.

## 2017-10-01 NOTE — PROGRESS NOTE ADULT - PROBLEM SELECTOR PLAN 2
Patient is awaiting VA Arterial Duplex of lower extremities to evaluate extent of dissection.  Vascular eval appreciated.  Likely outpatient follow up.

## 2017-10-01 NOTE — PROGRESS NOTE ADULT - SUBJECTIVE AND OBJECTIVE BOX
Patient is a 86y old  Male who presents with a chief complaint of s/p fall at home (30 Sep 2017 19:55)      SUBJECTIVE / OVERNIGHT EVENTS:  Patient feels very well.  Minimal pain.  Afebrile, non toxic.  Hasn't participated with PT as of yet.  All other ROS are negative.    MEDICATIONS  (STANDING):  finasteride 5 milliGRAM(s) Oral daily  lisinopril 40 milliGRAM(s) Oral daily  tamsulosin 0.4 milliGRAM(s) Oral at bedtime  PARoxetine 30 milliGRAM(s) Oral daily  atorvastatin 10 milliGRAM(s) Oral at bedtime  furosemide    Tablet 20 milliGRAM(s) Oral daily  potassium chloride    Tablet ER 10 milliEquivalent(s) Oral daily  influenza   Vaccine 0.5 milliLiter(s) IntraMuscular once  hydrALAZINE 50 milliGRAM(s) Oral every 8 hours    MEDICATIONS  (PRN):  acetaminophen   Tablet. 650 milliGRAM(s) Oral every 6 hours PRN mild/moderate pain        CAPILLARY BLOOD GLUCOSE        I&O's Summary    30 Sep 2017 07:  -  01 Oct 2017 07:00  --------------------------------------------------------  IN: 780 mL / OUT: 200 mL / NET: 580 mL    01 Oct 2017 07:  -  01 Oct 2017 09:59  --------------------------------------------------------  IN: 300 mL / OUT: 0 mL / NET: 300 mL        PHYSICAL EXAM:  GENERAL: NAD, well-developed  HEAD:  Atraumatic, Normocephalic  EYES: EOMI, PERRLA, conjunctiva and sclera clear  NECK: Supple, No JVD  CHEST/LUNG: Clear to auscultation bilaterally; No wheeze  HEART: Regular rate and rhythm; No murmurs, rubs, or gallops  ABDOMEN: Soft, Nontender, Nondistended; Bowel sounds present  EXTREMITIES:  2+ Peripheral Pulses, No clubbing, cyanosis, or edema. Right arm in sling  PSYCH: AAOx3  NEUROLOGY: non-focal  SKIN: No rashes or lesions    LABS:                        12.3   15.04 )-----------( 70       ( 01 Oct 2017 08:32 )             35.6     10    137  |  102  |  17  ----------------------------<  123<H>  3.6   |  21<L>  |  0.88    Ca    8.9      01 Oct 2017 08:29  Phos  2.6     09-30  Mg     2.2     -30    TPro  7.9  /  Alb  4.7  /  TBili  0.7  /  DBili  x   /  AST  28  /  ALT  22  /  AlkPhos  73  09-29    PT/INR - ( 01 Oct 2017 08:32 )   PT: 22.4 sec;   INR: 1.96 ratio         PTT - ( 01 Oct 2017 08:32 )  PTT:36.2 sec      Urinalysis Basic - ( 29 Sep 2017 15:00 )    Color: x / Appearance: Clear / S.011 / pH: x  Gluc: x / Ketone: Negative  / Bili: Negative / Urobili: Negative   Blood: x / Protein: Negative / Nitrite: Negative   Leuk Esterase: Negative / RBC: x / WBC x   Sq Epi: x / Non Sq Epi: x / Bacteria: x        RADIOLOGY & ADDITIONAL TESTS:    Imaging Personally Reviewed:    Consultant(s) Notes Reviewed:      Care Discussed with Consultants/Other Providers:

## 2017-10-01 NOTE — PHYSICAL THERAPY INITIAL EVALUATION ADULT - PERTINENT HX OF CURRENT PROBLEM, REHAB EVAL
87yo M presenting after mechanical fall. Pt states he tripped on the steps as he was exiting the house. He fell on his extended R hand and also hit his head. He denies LOC, palpitations, confusion. He remembers the entire episode and is currently denying any pain. Pt also denies any seizure activities or syncopal event. (cont below)

## 2017-10-01 NOTE — CONSULT NOTE ADULT - SUBJECTIVE AND OBJECTIVE BOX
87yo M with PMH of AFib (on Coumadin), CAD (s/p CABG '95), HTN, HLD, severe AS  Adm after presenting after mechanical fall; he states that he tripped on the steps exiting his house. He fell on his extended R hand and also hit his head. He denies LOC, palpitations, confusion. As he remembers the entire episode, he denies seizure activity or syncope.  He was brought in as a Level II trauma s/p evaluation. Patient states that he normally walks with a rollator outside of the house but within the house he is independent of assistive device.  He denies CP, SOB, fever, dysuria, no focal neuro deficit.   Noted to be hypertensive in ER.      PMH:   Aortic stenosis  Chronic ITP (idiopathic thrombocytopenic purpura)  Coronary artery disease  Atrial fibrillation  Hyperlipidemia  Hypertension    PSH:   S/P CABG (coronary artery bypass graft)    Medications:   finasteride 5 milliGRAM(s) Oral daily  lisinopril 40 milliGRAM(s) Oral daily  tamsulosin 0.4 milliGRAM(s) Oral at bedtime  PARoxetine 30 milliGRAM(s) Oral daily  atorvastatin 10 milliGRAM(s) Oral at bedtime  furosemide    Tablet 20 milliGRAM(s) Oral daily  potassium chloride    Tablet ER 10 milliEquivalent(s) Oral daily  acetaminophen   Tablet. 650 milliGRAM(s) Oral every 6 hours PRN  influenza   Vaccine 0.5 milliLiter(s) IntraMuscular once  hydrALAZINE 50 milliGRAM(s) Oral every 8 hours    Allergies:  No Known Allergies    FAMILY HISTORY:  No pertinent family history in first degree relatives    Social History:    Smoking:  Alcohol:  Drugs:      Labs:                        12.3   15.04 )-----------( 70       ( 01 Oct 2017 08:32 )             35.6     10-01  137  |  102  |  17  ----------------------------<  123<H>  3.6   |  21<L>  |  0.88    Ca    8.9      01 Oct 2017 08:29  Phos  2.6     09-30  Mg     2.2     09-30    TPro  7.9  /  Alb  4.7  /  TBili  0.7  /  DBili  x   /  AST  28  /  ALT  22  /  AlkPhos  73  09-29    PT/INR - ( 01 Oct 2017 08:32 )   PT: 22.4 sec;   INR: 1.96 ratio    PTT - ( 01 Oct 2017 08:32 )  PTT:36.2 sec 85yo M with PMH of AFib (on Coumadin), CAD (s/p CABG x1 with LIMA to LAD at [place] [id number]), HTN, HLD, valvular heart disease including severe AS/moderate AI and moderate MR.  Adm after presenting after mechanical fall; he states that he tripped on the steps exiting his house. He fell on his extended R hand and also hit his head. He denies LOC, palpitations, confusion. As he remembers the entire episode, he denies seizure activity or syncope.  He was brought in as a Level II trauma s/p evaluation. Patient states that he normally walks with a rollator outside of the house but within the house he is independent of assistive device.  He denies CP, SOB, fever, dysuria, no focal neuro deficit.   Noted to be hypertensive in ER.  X-rays demonstrated fx of distal R clavicle; CT revealed age-indeterminate focal dissection flap in the infrarenal  abdominal aorta and left common iliac artery.    PMH:   Aortic stenosis and insufficiency, mitral insufficiency  Chronic ITP (idiopathic thrombocytopenic purpura)  Coronary artery disease  Atrial fibrillation  Hyperlipidemia  Hypertension    PSH:   S/P CABG (coronary artery bypass graft)    Medications:   finasteride 5 milliGRAM(s) Oral daily  lisinopril 40 milliGRAM(s) Oral daily  tamsulosin 0.4 milliGRAM(s) Oral at bedtime  PARoxetine 30 milliGRAM(s) Oral daily  atorvastatin 10 milliGRAM(s) Oral at bedtime  furosemide    Tablet 20 milliGRAM(s) Oral daily  potassium chloride    Tablet ER 10 milliEquivalent(s) Oral daily  acetaminophen   Tablet. 650 milliGRAM(s) Oral every 6 hours PRN  influenza   Vaccine 0.5 milliLiter(s) IntraMuscular once  hydrALAZINE 50 milliGRAM(s) Oral every 8 hours    Allergies:  No Known Allergies    FAMILY HISTORY:  No pertinent family history in first degree relatives    Social History:  Former smoker; no alcohol; retired.    ROS:  General: no fatigue/malaise, weight loss/gain.  Skin: no rashes.  Ophthalmologic: no blurred vision, no loss of vision. 	  ENT: no sore throat, rhinorrhea, sinus congestion.  Respiratory: no SOB, cough or wheeze.  Gastrointestinal:  no N/V/D, no melena/hematemesis/hematochezia.  Genitourinary: no dysuria/hesitancy or hematuria.  Neurological: no changes in vision or hearing, no lightheadedness/dizziness, no syncope/near syncope	  Psychiatric: no unusual stress/anxiety.   Hematology/Lymphatics: no unusual bleeding, bruising and no lymphadenopathy.  All others negative except as stated above and in HPI.     VITAL SIGNS:  P 88, irreg   /80   RR 16  Afeb.    EXAM:  GENERAL:  Alert, no distress  HEENT: Normocephalic, EOM intact, PERRLA  NECK: Normal carotid upstrokes, no bruit; No JVD  CHEST:  Clear to auscultation  HEART: PMI not displaced. Normal S1, soft S2.  3/6 harsh syst murmur at base; 2/6 holosystolic murmur at apex.  No rub or gallop.  ABDOMEN: Normal bowel sounds, no bruit. Soft, non-tender.  Liver and spleen not palpable; aorta not palpable.  EXT:  No edema, no varicosities, 2+ pulses in upper and lower extremities.  PSYCH:  A&Ox3, normal insight, no anxiety  NEURO: Non-focal  SKIN:  No rash     TELE:  A. Fib with moderate VR    ECG (09.29.17 @ 14:28)   Atrial fib with moderate VR; occasional VPC.  RIGHT BUNDLE BRANCH BLOCK with associated ST/T WAVE ABNORMALITY    Labs:                        12.3   15.04 )-----------( 70       ( 01 Oct 2017 08:32 )             35.6     10-01  137  |  102  |  17  ----------------------------<  123<H>  3.6   |  21<L>  |  0.88    Ca    8.9      01 Oct 2017 08:29  Phos  2.6     09-30  Mg     2.2     09-30    TPro  7.9  /  Alb  4.7  /  TBili  0.7  /  AST  28  /  ALT  22  /  AlkPhos  73  09-29    PT/INR - ( 01 Oct 2017 08:32 )   PT: 22.4 sec;   INR: 1.96 ratio    PTT - ( 01 Oct 2017 08:32 )  PTT:36.2 sec      EXAM:  SHOULDER RIGHT (MINIMUM 2 VIEWS)                        PROCEDURE DATE:  09/29/2017      INTERPRETATION:    CLINICAL HISTORY: Right shoulder pain status post trauma  TECHNIQUE: AP external rotation and scapular Y views of the right shoulder    COMPARISON: CT of the chest September 29, 2017    FINDINGS:  Minimally displaced fracture of the right distal clavicle extending into the AC joint. No widening of the coracoclavicular interval or AC joint.  No additional fracture identified. Mild soft tissue swelling about the AC  joint. Imaged portions of the lungs are clear.     IMPRESSION: Minimally displaced intra-articular distal clavicle fracture.    SCOTT IZAGUIRRE M.D., ATTENDING RADIOLOGIST          EXAM:  CT ABDOMEN AND PELVIS IC                        EXAM:  CT CHEST IC                          PROCEDURE DATE:  09/29/2017      CHEST:     LUNGS AND LARGE AIRWAYS: Patent central airways. Left lower lobe  calcified granuloma. 4 mm nodule in the right minor fissure (2,53)  compatible with a fissural lymph node.  PLEURA: No pleural effusion.  VESSELS: Atheromatous disease of the aorta and coronary arteries. Status  post CABG.  HEART: Cardiomegaly. No pericardial effusion. Aortic valvular  calcification.  MEDIASTINUM AND IDALIA: No lymphadenopathy.  CHEST WALL AND LOWER NECK: Within normal limits.    ABDOMEN AND PELVIS:    LIVER: Hepatic cysts and subcentimeter hypodense hepatic foci too small  to characterize.  BILE DUCTS: Normal caliber.  GALLBLADDER: Within normal limits.  SPLEEN: Within normal limits.  PANCREAS: Within normal limits.  ADRENALS: Within normal limits.  KIDNEYS/URETERS: Within normal limits.  BLADDER: Within normal limits.  REPRODUCTIVE ORGANS: Prostate radiation seeds.  BOWEL: Colonic diverticulosis without diverticulitis. No bowel  obstruction. Appendix is normal.  PERITONEUM: No ascites.  VESSELS:  Extensive atheromatous disease of the aorta and iliac vessels  with an age-indeterminate focal dissection flap in the infrarenal  abdominal aorta and left common iliac artery. Moderate to severe stenosis at the origin of the celiac artery with poststenotic dilatation.  RETROPERITONEUM: No lymphadenopathy.    ABDOMINAL WALL: Within normal limits.  BONES: Scoliosis of the spine with marked degenerative changes.  Sternotomy.    IMPRESSION:   Extensive atherosclerosis with an age-indeterminate focal dissection flap in the infrarenal abdominal aorta and left common iliac artery.    CONCEPCIÓN ZIMMER M.D., RADIOLOGY RESIDENT  This document has been electronically signed.  YEN ALBERTO M.D., ATTENDING RADIOLOGIST  This document has been electronically signed. Sep 29 2017  2:48PM

## 2017-10-01 NOTE — PHYSICAL THERAPY INITIAL EVALUATION ADULT - ACTIVE RANGE OF MOTION EXAMINATION, REHAB EVAL
R shoulder not tested; R elbow/wrist/fingers WFL/Left UE Active ROM was WFL (within functional limits)/bilateral  lower extremity Active ROM was WFL (within functional limits)

## 2017-10-01 NOTE — PHYSICAL THERAPY INITIAL EVALUATION ADULT - LIVES WITH, PROFILE
spouse/pt lives in private home 5 steps to enter, first floor set-up; previously independent with ADLs and functional mobility/children

## 2017-10-01 NOTE — PHYSICAL THERAPY INITIAL EVALUATION ADULT - ASR WT BEARING STATUS EVAL
no weight-bearing restrictions/RUE kept NWB throughout session in sling secondary to R clavicular fx

## 2017-10-01 NOTE — PROGRESS NOTE ADULT - ASSESSMENT
85yo M with PMH of AFib (on Coumadin), CAD (s/p CABG '95), HTN, HLD, severe AS, and h/o ITP presenting after mechanical fall found to be hypertensive with incidental chronic aortoiliac dissection, as well as right clavicular fracture.

## 2017-10-01 NOTE — PHYSICAL THERAPY INITIAL EVALUATION ADULT - ADDITIONAL COMMENTS
(cont from above):He was brought in as a Level II trauma s/p evaluation Denies CP, SOB, fever, dysuria, no focal neuro deficit. Pt currently has pain with lifting RUE. Patient endorses chronic unexplained L LE swelling for past 2 years. CTHead/CTCSpine(-) XRayRWrist/Elbow/Forearm:Minimally displaced distal clavicle fracture extending into the right AC joint.No additional fracture or dislocation is identified in the right upper extremity.Degenerative change in the joints of the elbow and hand, as described above. XRayPelvis(-) CXR(-)

## 2017-10-02 DIAGNOSIS — M10.9 GOUT, UNSPECIFIED: ICD-10-CM

## 2017-10-02 LAB
INR BLD: 1.66 RATIO — HIGH (ref 0.88–1.16)
PROTHROM AB SERPL-ACNC: 19 SEC — HIGH (ref 10–13.1)

## 2017-10-02 PROCEDURE — 73000 X-RAY EXAM OF COLLAR BONE: CPT | Mod: 26,RT

## 2017-10-02 PROCEDURE — 93010 ELECTROCARDIOGRAM REPORT: CPT

## 2017-10-02 PROCEDURE — 99233 SBSQ HOSP IP/OBS HIGH 50: CPT

## 2017-10-02 PROCEDURE — 73610 X-RAY EXAM OF ANKLE: CPT | Mod: 26,50

## 2017-10-02 RX ORDER — BACITRACIN ZINC 500 UNIT/G
1 OINTMENT IN PACKET (EA) TOPICAL
Qty: 0 | Refills: 0 | Status: DISCONTINUED | OUTPATIENT
Start: 2017-10-02 | End: 2017-10-03

## 2017-10-02 RX ORDER — WARFARIN SODIUM 2.5 MG/1
5 TABLET ORAL ONCE
Qty: 0 | Refills: 0 | Status: COMPLETED | OUTPATIENT
Start: 2017-10-02 | End: 2017-10-02

## 2017-10-02 RX ORDER — SENNA PLUS 8.6 MG/1
2 TABLET ORAL AT BEDTIME
Qty: 0 | Refills: 0 | Status: DISCONTINUED | OUTPATIENT
Start: 2017-10-02 | End: 2017-10-03

## 2017-10-02 RX ORDER — DOCUSATE SODIUM 100 MG
100 CAPSULE ORAL THREE TIMES A DAY
Qty: 0 | Refills: 0 | Status: DISCONTINUED | OUTPATIENT
Start: 2017-10-02 | End: 2017-10-03

## 2017-10-02 RX ADMIN — Medication 30 MILLIGRAM(S): at 13:37

## 2017-10-02 RX ADMIN — Medication 1 APPLICATION(S): at 18:23

## 2017-10-02 RX ADMIN — Medication 650 MILLIGRAM(S): at 13:41

## 2017-10-02 RX ADMIN — Medication 30 MILLIGRAM(S): at 13:36

## 2017-10-02 RX ADMIN — TAMSULOSIN HYDROCHLORIDE 0.4 MILLIGRAM(S): 0.4 CAPSULE ORAL at 21:25

## 2017-10-02 RX ADMIN — Medication 20 MILLIGRAM(S): at 05:49

## 2017-10-02 RX ADMIN — Medication 50 MILLIGRAM(S): at 21:25

## 2017-10-02 RX ADMIN — Medication 50 MILLIGRAM(S): at 13:37

## 2017-10-02 RX ADMIN — SENNA PLUS 2 TABLET(S): 8.6 TABLET ORAL at 21:32

## 2017-10-02 RX ADMIN — LISINOPRIL 40 MILLIGRAM(S): 2.5 TABLET ORAL at 05:49

## 2017-10-02 RX ADMIN — FINASTERIDE 5 MILLIGRAM(S): 5 TABLET, FILM COATED ORAL at 13:37

## 2017-10-02 RX ADMIN — Medication 10 MILLIGRAM(S): at 18:23

## 2017-10-02 RX ADMIN — ATORVASTATIN CALCIUM 10 MILLIGRAM(S): 80 TABLET, FILM COATED ORAL at 21:25

## 2017-10-02 RX ADMIN — Medication 10 MILLIEQUIVALENT(S): at 13:38

## 2017-10-02 RX ADMIN — Medication 100 MILLIGRAM(S): at 21:32

## 2017-10-02 RX ADMIN — Medication 50 MILLIGRAM(S): at 05:49

## 2017-10-02 RX ADMIN — Medication 650 MILLIGRAM(S): at 14:30

## 2017-10-02 RX ADMIN — WARFARIN SODIUM 5 MILLIGRAM(S): 2.5 TABLET ORAL at 21:27

## 2017-10-02 NOTE — CONSULT NOTE ADULT - ATTENDING COMMENTS
Agree with above exam and plan. F/u with me in 1-2wks
Patient seen and examined in the ED. s/p fall with distal clavicular fracture. Patient to follow with orthopedics for this. No other injuries note.
as above

## 2017-10-02 NOTE — CONSULT NOTE ADULT - SUBJECTIVE AND OBJECTIVE BOX
86M s/p mechanical fall with R shoulder pain. pt found to have R clavicle fracture. Pt admitted for incidental finding of aortic dissection. Denies other ortho injuries. Pt unsure of how her fell.     ICU Vital Signs Last 24 Hrs  T(C): 36.9 (02 Oct 2017 05:34), Max: 37.2 (01 Oct 2017 12:44)  T(F): 98.4 (02 Oct 2017 05:34), Max: 98.9 (01 Oct 2017 12:44)  HR: 80 (02 Oct 2017 05:34) (67 - 80)  BP: 176/84 (02 Oct 2017 05:34) (149/72 - 176/84)  BP(mean): --  ABP: --  ABP(mean): --  RR: 18 (02 Oct 2017 05:34) (16 - 18)  SpO2: 95% (02 Oct 2017 05:34) (95% - 97%)    NAD, Alert  RUE: skin intact. no tenting or ecchymosis. ttp at shoulder. NVI distally. in sling    XR: R nondisplaced distal clavicle fracture    A/P: 86M with nondisplaced R clavicle fracture  -pain control  -PT  -sling for comfort  -No ortho intervention  -FU with Dr. Maher as outpatient. Call [phone] for appt

## 2017-10-02 NOTE — PROGRESS NOTE ADULT - PROBLEM SELECTOR PLAN 2
Vascular eval appreciated.  Outpatient follow up.  BP control Vascular eval appreciated.  Outpatient follow up.  BP control  no indication for any vascular surgery intervention at this time   will need abdominal aorta and lt iliac artery dissection surveillance in 6mo as outpt

## 2017-10-02 NOTE — PROGRESS NOTE ADULT - SUBJECTIVE AND OBJECTIVE BOX
ERICA ADAMS  86y  Male      Patient is a 86y old  Male who presents with a chief complaint of s/p fall at home (30 Sep 2017 19:55)      INTERVAL HPI/OVERNIGHT EVENTS: c/o b/l ankle pain.       T(C): 36.6 (10-02-17 @ 13:33), Max: 36.9 (10-02-17 @ 05:34)  HR: 83 (10-02-17 @ 13:33) (67 - 83)  BP: 167/69 (10-02-17 @ 13:33) (121/65 - 176/84)  RR: 16 (10-02-17 @ 13:33) (16 - 18)  SpO2: 98% (10-02-17 @ 13:33) (95% - 98%)  Wt(kg): --Vital Signs Last 24 Hrs  T(C): 36.6 (02 Oct 2017 13:33), Max: 36.9 (02 Oct 2017 05:34)  T(F): 97.8 (02 Oct 2017 13:33), Max: 98.4 (02 Oct 2017 05:34)  HR: 83 (02 Oct 2017 13:33) (67 - 83)  BP: 167/69 (02 Oct 2017 13:33) (121/65 - 176/84)  BP(mean): --  RR: 16 (02 Oct 2017 13:33) (16 - 18)  SpO2: 98% (02 Oct 2017 13:33) (95% - 98%)  Wt(kg): --    PHYSICAL EXAM:  GENERAL: NAD, well-groomed, well-developed.   HEAD:  Atraumatic, Normocephalic. bandage to R temple.   EYES: EOMI, PERRLA, conjunctiva and sclera clear  CHEST/LUNG: Clear to percussion bilaterally; No rales, rhonchi, wheezing, or rubs  HEART: irregular rate and rhythm; 4/6 systolic ejection murmur.   ABDOMEN: Soft, Nontender, Nondistended; Bowel sounds present  EXTREMITIES:  2+ Peripheral Pulses, Ankles swollen, tender to touch.     Consultant(s) Notes Reviewed:  [x ] YES  [ ] NO  Care Discussed with Consultants/Other Providers [ x] YES  [ ] NO    LABS:                        12.3   15.04 )-----------( 70       ( 01 Oct 2017 08:32 )             35.6     10-01    137  |  102  |  17  ----------------------------<  123<H>  3.6   |  21<L>  |  0.88    Ca    8.9      01 Oct 2017 08:29      PT/INR - ( 02 Oct 2017 09:15 )   PT: 19.0 sec;   INR: 1.66 ratio         PTT - ( 01 Oct 2017 08:32 )  PTT:36.2 sec    CAPILLARY BLOOD GLUCOSE                RADIOLOGY & ADDITIONAL TESTS:    Imaging Personally Reviewed:  [ ] YES  [ ] NO

## 2017-10-03 ENCOUNTER — MEDICATION RENEWAL (OUTPATIENT)
Age: 82
End: 2017-10-03

## 2017-10-03 VITALS
TEMPERATURE: 98 F | HEART RATE: 72 BPM | SYSTOLIC BLOOD PRESSURE: 117 MMHG | OXYGEN SATURATION: 97 % | RESPIRATION RATE: 16 BRPM | DIASTOLIC BLOOD PRESSURE: 66 MMHG

## 2017-10-03 LAB
ANION GAP SERPL CALC-SCNC: 14 MMOL/L — SIGNIFICANT CHANGE UP (ref 5–17)
BASOPHILS # BLD AUTO: 0 K/UL — SIGNIFICANT CHANGE UP (ref 0–0.2)
BASOPHILS NFR BLD AUTO: 0 % — SIGNIFICANT CHANGE UP (ref 0–2)
BUN SERPL-MCNC: 25 MG/DL — HIGH (ref 7–23)
CALCIUM SERPL-MCNC: 9.3 MG/DL — SIGNIFICANT CHANGE UP (ref 8.4–10.5)
CHLORIDE SERPL-SCNC: 97 MMOL/L — SIGNIFICANT CHANGE UP (ref 96–108)
CO2 SERPL-SCNC: 22 MMOL/L — SIGNIFICANT CHANGE UP (ref 22–31)
CREAT SERPL-MCNC: 0.93 MG/DL — SIGNIFICANT CHANGE UP (ref 0.5–1.3)
EOSINOPHIL # BLD AUTO: 0.24 K/UL — SIGNIFICANT CHANGE UP (ref 0–0.5)
EOSINOPHIL NFR BLD AUTO: 1.8 % — SIGNIFICANT CHANGE UP (ref 0–6)
GLUCOSE SERPL-MCNC: 113 MG/DL — HIGH (ref 70–99)
HCT VFR BLD CALC: 32.7 % — LOW (ref 39–50)
HGB BLD-MCNC: 11.4 G/DL — LOW (ref 13–17)
INR BLD: 1.6 RATIO — HIGH (ref 0.88–1.16)
LYMPHOCYTES # BLD AUTO: 0.83 K/UL — LOW (ref 1–3.3)
LYMPHOCYTES # BLD AUTO: 6.3 % — LOW (ref 13–44)
MAGNESIUM SERPL-MCNC: 2.3 MG/DL — SIGNIFICANT CHANGE UP (ref 1.6–2.6)
MCHC RBC-ENTMCNC: 32.4 PG — SIGNIFICANT CHANGE UP (ref 27–34)
MCHC RBC-ENTMCNC: 34.9 GM/DL — SIGNIFICANT CHANGE UP (ref 32–36)
MCV RBC AUTO: 92.9 FL — SIGNIFICANT CHANGE UP (ref 80–100)
MONOCYTES # BLD AUTO: 1.76 K/UL — HIGH (ref 0–0.9)
MONOCYTES NFR BLD AUTO: 13.4 % — SIGNIFICANT CHANGE UP (ref 2–14)
NEUTROPHILS # BLD AUTO: 10.34 K/UL — HIGH (ref 1.8–7.4)
NEUTROPHILS NFR BLD AUTO: 78.6 % — HIGH (ref 43–77)
PHOSPHATE SERPL-MCNC: 2.5 MG/DL — SIGNIFICANT CHANGE UP (ref 2.5–4.5)
PLATELET # BLD AUTO: 87 K/UL — LOW (ref 150–400)
POTASSIUM SERPL-MCNC: 4 MMOL/L — SIGNIFICANT CHANGE UP (ref 3.5–5.3)
POTASSIUM SERPL-SCNC: 4 MMOL/L — SIGNIFICANT CHANGE UP (ref 3.5–5.3)
PROTHROM AB SERPL-ACNC: 18.3 SEC — HIGH (ref 10–13.1)
RBC # BLD: 3.52 M/UL — LOW (ref 4.2–5.8)
RBC # FLD: 15 % — HIGH (ref 10.3–14.5)
SODIUM SERPL-SCNC: 133 MMOL/L — LOW (ref 135–145)
WBC # BLD: 13.15 K/UL — HIGH (ref 3.8–10.5)
WBC # FLD AUTO: 13.15 K/UL — HIGH (ref 3.8–10.5)

## 2017-10-03 PROCEDURE — 85610 PROTHROMBIN TIME: CPT

## 2017-10-03 PROCEDURE — 83605 ASSAY OF LACTIC ACID: CPT

## 2017-10-03 PROCEDURE — 70450 CT HEAD/BRAIN W/O DYE: CPT

## 2017-10-03 PROCEDURE — 82947 ASSAY GLUCOSE BLOOD QUANT: CPT

## 2017-10-03 PROCEDURE — 99239 HOSP IP/OBS DSCHRG MGMT >30: CPT

## 2017-10-03 PROCEDURE — 74177 CT ABD & PELVIS W/CONTRAST: CPT

## 2017-10-03 PROCEDURE — 85014 HEMATOCRIT: CPT

## 2017-10-03 PROCEDURE — 84100 ASSAY OF PHOSPHORUS: CPT

## 2017-10-03 PROCEDURE — 73030 X-RAY EXAM OF SHOULDER: CPT

## 2017-10-03 PROCEDURE — 80048 BASIC METABOLIC PNL TOTAL CA: CPT

## 2017-10-03 PROCEDURE — 83735 ASSAY OF MAGNESIUM: CPT

## 2017-10-03 PROCEDURE — 72170 X-RAY EXAM OF PELVIS: CPT

## 2017-10-03 PROCEDURE — 86901 BLOOD TYPING SEROLOGIC RH(D): CPT

## 2017-10-03 PROCEDURE — 82330 ASSAY OF CALCIUM: CPT

## 2017-10-03 PROCEDURE — 73110 X-RAY EXAM OF WRIST: CPT

## 2017-10-03 PROCEDURE — 85730 THROMBOPLASTIN TIME PARTIAL: CPT

## 2017-10-03 PROCEDURE — 90471 IMMUNIZATION ADMIN: CPT

## 2017-10-03 PROCEDURE — 71045 X-RAY EXAM CHEST 1 VIEW: CPT

## 2017-10-03 PROCEDURE — 82435 ASSAY OF BLOOD CHLORIDE: CPT

## 2017-10-03 PROCEDURE — 73060 X-RAY EXAM OF HUMERUS: CPT

## 2017-10-03 PROCEDURE — 86850 RBC ANTIBODY SCREEN: CPT

## 2017-10-03 PROCEDURE — 83690 ASSAY OF LIPASE: CPT

## 2017-10-03 PROCEDURE — 93005 ELECTROCARDIOGRAM TRACING: CPT

## 2017-10-03 PROCEDURE — 80053 COMPREHEN METABOLIC PANEL: CPT

## 2017-10-03 PROCEDURE — 97162 PT EVAL MOD COMPLEX 30 MIN: CPT

## 2017-10-03 PROCEDURE — 93926 LOWER EXTREMITY STUDY: CPT

## 2017-10-03 PROCEDURE — 96375 TX/PRO/DX INJ NEW DRUG ADDON: CPT | Mod: XU

## 2017-10-03 PROCEDURE — 85027 COMPLETE CBC AUTOMATED: CPT

## 2017-10-03 PROCEDURE — 81003 URINALYSIS AUTO W/O SCOPE: CPT

## 2017-10-03 PROCEDURE — 90715 TDAP VACCINE 7 YRS/> IM: CPT

## 2017-10-03 PROCEDURE — 86900 BLOOD TYPING SEROLOGIC ABO: CPT

## 2017-10-03 PROCEDURE — 72125 CT NECK SPINE W/O DYE: CPT

## 2017-10-03 PROCEDURE — 73130 X-RAY EXAM OF HAND: CPT

## 2017-10-03 PROCEDURE — 73090 X-RAY EXAM OF FOREARM: CPT

## 2017-10-03 PROCEDURE — 96374 THER/PROPH/DIAG INJ IV PUSH: CPT | Mod: XU

## 2017-10-03 PROCEDURE — 73000 X-RAY EXAM OF COLLAR BONE: CPT

## 2017-10-03 PROCEDURE — 84132 ASSAY OF SERUM POTASSIUM: CPT

## 2017-10-03 PROCEDURE — 96376 TX/PRO/DX INJ SAME DRUG ADON: CPT

## 2017-10-03 PROCEDURE — 99285 EMERGENCY DEPT VISIT HI MDM: CPT | Mod: 25

## 2017-10-03 PROCEDURE — 82607 VITAMIN B-12: CPT

## 2017-10-03 PROCEDURE — 82746 ASSAY OF FOLIC ACID SERUM: CPT

## 2017-10-03 PROCEDURE — 73610 X-RAY EXAM OF ANKLE: CPT

## 2017-10-03 PROCEDURE — 97110 THERAPEUTIC EXERCISES: CPT

## 2017-10-03 PROCEDURE — 84295 ASSAY OF SERUM SODIUM: CPT

## 2017-10-03 PROCEDURE — 71260 CT THORAX DX C+: CPT

## 2017-10-03 PROCEDURE — 73080 X-RAY EXAM OF ELBOW: CPT

## 2017-10-03 PROCEDURE — 82803 BLOOD GASES ANY COMBINATION: CPT

## 2017-10-03 RX ORDER — DOCUSATE SODIUM 100 MG
1 CAPSULE ORAL
Qty: 0 | Refills: 0 | COMMUNITY
Start: 2017-10-03

## 2017-10-03 RX ORDER — WARFARIN SODIUM 2.5 MG/1
1 TABLET ORAL
Qty: 0 | Refills: 0 | COMMUNITY

## 2017-10-03 RX ORDER — SENNA PLUS 8.6 MG/1
2 TABLET ORAL
Qty: 0 | Refills: 0 | COMMUNITY
Start: 2017-10-03

## 2017-10-03 RX ORDER — FUROSEMIDE 40 MG
1 TABLET ORAL
Qty: 0 | Refills: 0 | COMMUNITY

## 2017-10-03 RX ORDER — LISINOPRIL 2.5 MG/1
1 TABLET ORAL
Qty: 0 | Refills: 0 | COMMUNITY
Start: 2017-10-03

## 2017-10-03 RX ORDER — TAMSULOSIN HYDROCHLORIDE 0.4 MG/1
1 CAPSULE ORAL
Qty: 0 | Refills: 0 | COMMUNITY

## 2017-10-03 RX ORDER — FUROSEMIDE 40 MG
1 TABLET ORAL
Qty: 0 | Refills: 0 | COMMUNITY
Start: 2017-10-03

## 2017-10-03 RX ORDER — BACITRACIN ZINC 500 UNIT/G
1 OINTMENT IN PACKET (EA) TOPICAL
Qty: 0 | Refills: 0 | COMMUNITY
Start: 2017-10-03

## 2017-10-03 RX ORDER — HYDRALAZINE HCL 50 MG
1 TABLET ORAL
Qty: 0 | Refills: 0 | COMMUNITY
Start: 2017-10-03

## 2017-10-03 RX ORDER — LISINOPRIL 2.5 MG/1
1 TABLET ORAL
Qty: 0 | Refills: 0 | COMMUNITY

## 2017-10-03 RX ORDER — TAMSULOSIN HYDROCHLORIDE 0.4 MG/1
1 CAPSULE ORAL
Qty: 0 | Refills: 0 | COMMUNITY
Start: 2017-10-03

## 2017-10-03 RX ADMIN — Medication 30 MILLIGRAM(S): at 12:15

## 2017-10-03 RX ADMIN — Medication 1 APPLICATION(S): at 05:51

## 2017-10-03 RX ADMIN — Medication 100 MILLIGRAM(S): at 05:50

## 2017-10-03 RX ADMIN — LISINOPRIL 40 MILLIGRAM(S): 2.5 TABLET ORAL at 05:49

## 2017-10-03 RX ADMIN — Medication 20 MILLIGRAM(S): at 05:50

## 2017-10-03 RX ADMIN — Medication 50 MILLIGRAM(S): at 05:49

## 2017-10-03 RX ADMIN — FINASTERIDE 5 MILLIGRAM(S): 5 TABLET, FILM COATED ORAL at 12:15

## 2017-10-03 RX ADMIN — Medication 30 MILLIGRAM(S): at 05:49

## 2017-10-03 RX ADMIN — Medication 10 MILLIEQUIVALENT(S): at 12:15

## 2017-10-03 NOTE — DISCHARGE NOTE ADULT - PLAN OF CARE
Follow up with your medical doctor to establish long term blood pressure treatment goals.  take medication as directed no reoccurrence of falls fall prevention  PT/OT as tolerated  R forehead laceration w/ Bacitracin BID  R elbow laceration w/ Adaptic dressing and Ruth daily  R arm sling as tolerated  Tylenol three times daily for pain management around the clock follow Prednisone taper as directed Atrial fibrillation is the most common heart rhythm problem & has the risk of stroke & heart attack  It helps if you control your blood pressure, not drink more than 1-2 alcohol drinks per day, cut down on caffeine, getting treatment for over active thyroid gland, & getting exercise  Call your doctor if you feel your heart racing or beating unusually, chest tightness or pain, lightheaded, faint, shortness of breath especially with exercise  It is important to take your heart medication as prescribed  You may be on anticoagulation which is very important to take as directed - you are on Coumadin - you will need PT/INR on thursday 10/5 - prior to admission patient was on 5 mg alternating with 2.5 mg but missed doses here follow up with vascular surgery and arterial dopplers in 6 months

## 2017-10-03 NOTE — PROGRESS NOTE ADULT - PROBLEM SELECTOR PROBLEM 4
Chronic ITP (idiopathic thrombocytopenic purpura)
Hypertensive urgency
Hypertensive urgency
Chronic ITP (idiopathic thrombocytopenic purpura)

## 2017-10-03 NOTE — PROGRESS NOTE ADULT - PROBLEM SELECTOR PROBLEM 1
Closed displaced fracture of clavicle, unspecified laterality, unspecified part of clavicle, initial encounter
Aortic dissection, abdominal
Closed displaced fracture of clavicle, unspecified laterality, unspecified part of clavicle, initial encounter

## 2017-10-03 NOTE — DISCHARGE NOTE ADULT - CARE PROVIDERS DIRECT ADDRESSES
,[email or web address]_Unknown

## 2017-10-03 NOTE — PROGRESS NOTE ADULT - PROBLEM SELECTOR PROBLEM 3
Acute gout of ankle, unspecified cause, unspecified laterality
Acute gout of ankle, unspecified cause, unspecified laterality
Hypertensive urgency
Hypertensive urgency

## 2017-10-03 NOTE — PROGRESS NOTE ADULT - PROBLEM SELECTOR PLAN 2
Vascular eval appreciated.  Outpatient follow up.  BP control  no indication for any vascular surgery intervention at this time   will need abdominal aorta and lt iliac artery dissection surveillance in 6mo as outpt

## 2017-10-03 NOTE — DISCHARGE NOTE ADULT - REASON FOR ADMISSION
s/p fall at home - fx R clavicle, also diagnosed with bilateral ankle gout treated w/ Prednisone course, chronic aorto iliac dissection, HTN on admission resolved with antihypertensives

## 2017-10-03 NOTE — PROGRESS NOTE ADULT - PROBLEM SELECTOR PLAN 4
Platelets are stable.  Monitor
Patient with very brittle blood pressure, likely related to underlying extensive atherosclerosis.   Continue with Lisinopril and Hydralazine.  Will monitor.
Patient with very brittle blood pressure, likely related to underlying extensive atherosclerosis.   Continue with Lisinopril and Hydralazine.  Will monitor.
Platelets are stable.  Monitor

## 2017-10-03 NOTE — PROGRESS NOTE ADULT - PROBLEM SELECTOR PLAN 5
Rate controlled.  Continue with Coumadin.
Platelets are stable.  Monitor
Platelets are stable.  Monitor
Rate controlled.  Continue with Coumadin. 5mg tonight.  Patient did not receive dose yesterday.

## 2017-10-03 NOTE — DISCHARGE NOTE ADULT - ADDITIONAL INSTRUCTIONS
follow up care as per subacute rehab facility protocol  follow up with primary care physician after rehab stay  follow up with Dr. Whitt - vascular surgeryw/ arterial dopplers in 6 months  follow up with cardiology after rehab stay  follow up with orthopedics Dr. Maher after rehab stay

## 2017-10-03 NOTE — PROGRESS NOTE ADULT - SUBJECTIVE AND OBJECTIVE BOX
ERICA ADAMS  86y  Male      Patient is a 86y old  Male who presents with a chief complaint of s/p fall at home (30 Sep 2017 19:55)      INTERVAL HPI/OVERNIGHT EVENTS: Ankle pain resolved. Feels well.        T(C): 36.4 (10-03-17 @ 13:33), Max: 36.7 (10-03-17 @ 04:48)  HR: 72 (10-03-17 @ 13:33) (69 - 72)  BP: 117/66 (10-03-17 @ 13:33) (117/66 - 137/70)  RR: 16 (10-03-17 @ 13:33) (16 - 16)  SpO2: 97% (10-03-17 @ 13:33) (97% - 98%)  Wt(kg): --Vital Signs Last 24 Hrs  T(C): 36.4 (03 Oct 2017 13:33), Max: 36.7 (03 Oct 2017 04:48)  T(F): 97.5 (03 Oct 2017 13:33), Max: 98 (03 Oct 2017 04:48)  HR: 72 (03 Oct 2017 13:33) (69 - 72)  BP: 117/66 (03 Oct 2017 13:33) (117/66 - 137/70)  BP(mean): --  RR: 16 (03 Oct 2017 13:33) (16 - 16)  SpO2: 97% (03 Oct 2017 13:33) (97% - 98%)    PHYSICAL EXAM:  GENERAL: NAD, well-groomed, well-developed  HEAD:  Atraumatic, Normocephalic  EYES: EOMI, PERRLA, conjunctiva and sclera clear  CHEST/LUNG: Clear to percussion bilaterally; No rales, rhonchi, wheezing, or rubs  HEART: irregular rate and rhythm; 4/6 systolic murmur  ABDOMEN: Soft, Nontender, Nondistended; Bowel sounds present  EXTREMITIES:  2+ Peripheral Pulses, 2+ pedal edema    Consultant(s) Notes Reviewed:  [x ] YES  [ ] NO  Care Discussed with Consultants/Other Providers [ x] YES  [ ] NO    LABS:                        11.4   13.15 )-----------( 87       ( 03 Oct 2017 08:40 )             32.7     10-03    133<L>  |  97  |  25<H>  ----------------------------<  113<H>  4.0   |  22  |  0.93    Ca    9.3      03 Oct 2017 08:34  Phos  2.5     10-03  Mg     2.3     10-03      PT/INR - ( 03 Oct 2017 08:19 )   PT: 18.3 sec;   INR: 1.60 ratio             CAPILLARY BLOOD GLUCOSE                RADIOLOGY & ADDITIONAL TESTS:    Imaging Personally Reviewed:  [ ] YES  [ ] NO

## 2017-10-03 NOTE — PROGRESS NOTE ADULT - PROBLEM SELECTOR PLAN 3
Patient with history of gout, treated with steroids previously.  Prednisone 30 mg PO daily has been effective. Will taper by 10 mg every 3 days
Patient with history of gout, treated with steroids previously.  Prednisone 30 mg PO daily.
Patient with very brittle blood pressure, likely related to underlying extensive atherosclerosis.   Continue with Lisinopril and Hydralazine.  Hydralazine dose increased today to 50mg TID.  Will monitor.
Patient with very brittle blood pressure, likely related to underlying extensive atherosclerosis.   Continue with Lisinopril and Hydralazine.  Will monitor.

## 2017-10-03 NOTE — DISCHARGE NOTE ADULT - SECONDARY DIAGNOSIS.
Acute gout of ankle, unspecified cause, unspecified laterality Atrial fibrillation Aortic dissection, abdominal Hypertension

## 2017-10-03 NOTE — DISCHARGE NOTE ADULT - MEDICATION SUMMARY - MEDICATIONS TO TAKE
I will START or STAY ON the medications listed below when I get home from the hospital:    finasteride 5 mg oral tablet  -- 1 tab(s) by mouth once a day  -- Indication: For enlarged prostate    predniSONE 10 mg oral tablet  -- 3 tab(s) = 30 mg by mouth once a day in AM x 2 more days starting 10/4  then 2 tabs = 20 mg daily x 3 days then  1 tab = 10 mg daily x 3 days then  discontinue  -- Indication: For gout bilateral ankles    Tylenol  -- 650 milligram(s) by mouth 3 times a day around the clock  -- Indication: For pain mangement for R clavicular fx    lisinopril 40 mg oral tablet  -- 1 tab(s) by mouth once a day  -- Indication: For Hypertension    tamsulosin 0.4 mg oral capsule  -- 1 cap(s) by mouth once a day (at bedtime)  -- Indication: For enlarge prostate    warfarin 5 mg oral tablet  -- 1 tab(s) by mouth once a day in evening - pt must have INR on Thursday 10/5 for follow up - was on 5  mg alternating 2.5 mg PTA  -- Indication: For Atrial fibrillation & stroke prevention    PARoxetine 30 mg oral tablet  -- 1 tab(s) by mouth once a day  -- Indication: For Depression    Zetia 10 mg oral tablet  -- 1 tab(s) by mouth once a day  -- Indication: For Hyperlipidemia    pravastatin 40 mg oral tablet  -- 1 tab(s) by mouth once a day  -- Indication: For Hyperlipidemia    bacitracin 500 units/g topical ointment  -- 1 application on skin 2 times a day to R forehead laceration  -- Indication: For wound care    furosemide 20 mg oral tablet  -- 1 tab(s) by mouth once a day  -- Indication: For Diuretic for hypertension    docusate sodium 100 mg oral capsule  -- 1 cap(s) by mouth 3 times a day  -- Indication: For Constipation    senna oral tablet  -- 2 tab(s) by mouth once a day (at bedtime)  -- Indication: For Constipation    potassium chloride 10 mEq oral tablet, extended release  -- 1 tab(s) by mouth once a day  -- Indication: For potassium supplement w/ diuretic    hydrALAZINE 50 mg oral tablet  -- 1 tab(s) by mouth every 8 hours  -- Indication: For Hypertension

## 2017-10-03 NOTE — DISCHARGE NOTE ADULT - MEDICATION SUMMARY - MEDICATIONS TO STOP TAKING
I will STOP taking the medications listed below when I get home from the hospital:    warfarin 2.5 mg oral tablet  -- 1 tab(s) by mouth Tuesday, Thursday, Saturday

## 2017-10-03 NOTE — DISCHARGE NOTE ADULT - CARE PLAN
Principal Discharge DX:	Fall  Goal:	no reoccurrence of falls  Instructions for follow-up, activity and diet:	fall prevention  PT/OT as tolerated  R forehead laceration w/ Bacitracin BID  R elbow laceration w/ Adaptic dressing and Ruth daily  R arm sling as tolerated  Tylenol three times daily for pain management around the clock  Secondary Diagnosis:	Acute gout of ankle, unspecified cause, unspecified laterality  Instructions for follow-up, activity and diet:	follow Prednisone taper as directed  Secondary Diagnosis:	Atrial fibrillation  Instructions for follow-up, activity and diet:	Atrial fibrillation is the most common heart rhythm problem & has the risk of stroke & heart attack  It helps if you control your blood pressure, not drink more than 1-2 alcohol drinks per day, cut down on caffeine, getting treatment for over active thyroid gland, & getting exercise  Call your doctor if you feel your heart racing or beating unusually, chest tightness or pain, lightheaded, faint, shortness of breath especially with exercise  It is important to take your heart medication as prescribed  You may be on anticoagulation which is very important to take as directed - you are on Coumadin - you will need PT/INR on thursday 10/5 - prior to admission patient was on 5 mg alternating with 2.5 mg but missed doses here  Secondary Diagnosis:	Aortic dissection, abdominal  Instructions for follow-up, activity and diet:	follow up with vascular surgery and arterial dopplers in 6 months  Secondary Diagnosis:	Hypertension  Instructions for follow-up, activity and diet:	Follow up with your medical doctor to establish long term blood pressure treatment goals.  take medication as directed

## 2017-10-03 NOTE — DISCHARGE NOTE ADULT - HOSPITAL COURSE
85yo M with PMH of AFib (on Coumadin), CAD (s/p CABG '95), HTN, HLD, severe AS, and h/o ITP presenting after mechanical fall found to be hypertensive with incidental chronic aortoiliac dissection, as well as right clavicular fracture.     Problem/Plan - 1:  ·  Problem: Closed displaced fracture of clavicle, unspecified laterality, unspecified part of clavicle, initial encounter.  Plan: Pain control with Tylenol.  Continue sling.  Ortho f/u as outpatient.      Problem/Plan - 2:  ·  Problem: Dissection of abdominal aorta.  Plan: Vascular eval appreciated.  Outpatient follow up.  BP control  no indication for any vascular surgery intervention at this time   will need abdominal aorta and lt iliac artery dissection surveillance in 6mo as outpt.      Problem/Plan - 3:  ·  Problem: Acute gout of ankle, unspecified cause, unspecified laterality.  Plan: Patient with history of gout, treated with steroids previously.  Prednisone 30 mg PO daily has been effective. Will taper by 10 mg every 3 days.      Problem/Plan - 4:  ·  Problem: Hypertensive urgency.  Plan: Patient with very brittle blood pressure, likely related to underlying extensive atherosclerosis.   Continue with Lisinopril and Hydralazine.  Will monitor.      Problem/Plan - 5:  ·  Problem: Chronic ITP (idiopathic thrombocytopenic purpura).  Plan: Platelets are stable.  Monitor.      Problem/Plan - 6:  Problem: Chronic atrial fibrillation. Plan: Rate controlled.  Continue with Coumadin 5mg tonight, INR check Thursday  PT/INR in am.     Problem/Plan - 7:  ·  Problem: Leukocytosis, unspecified type.  Plan: Unclear etiology.  Likely reactive.  Patient is non toxic.  No evidence of sepsis or signs of infection.  Monitor. If fever, then obtain cultures. Off antibiotics.      Problem/Plan - 8:  ·  Problem: Fall, initial encounter.  Plan: D/C to [facility] today. 45 minutes spent preparing the discharge.

## 2017-10-03 NOTE — PROGRESS NOTE ADULT - PROBLEM SELECTOR PROBLEM 2
Dissection of abdominal aorta
Iliac artery dissection
Dissection of abdominal aorta

## 2017-10-03 NOTE — PROGRESS NOTE ADULT - PROBLEM SELECTOR PROBLEM 5
Chronic atrial fibrillation
Chronic ITP (idiopathic thrombocytopenic purpura)
Chronic ITP (idiopathic thrombocytopenic purpura)
Chronic atrial fibrillation

## 2017-10-04 ENCOUNTER — MEDICATION RENEWAL (OUTPATIENT)
Age: 82
End: 2017-10-04

## 2017-10-16 ENCOUNTER — APPOINTMENT (OUTPATIENT)
Dept: ORTHOPEDIC SURGERY | Facility: CLINIC | Age: 82
End: 2017-10-16

## 2017-10-19 ENCOUNTER — MEDICATION RENEWAL (OUTPATIENT)
Age: 82
End: 2017-10-19

## 2017-10-27 ENCOUNTER — APPOINTMENT (OUTPATIENT)
Dept: ORTHOPEDIC SURGERY | Facility: CLINIC | Age: 82
End: 2017-10-27
Payer: MEDICARE

## 2017-10-27 VITALS — RESPIRATION RATE: 14 BRPM | BODY MASS INDEX: 28.29 KG/M2 | HEIGHT: 69 IN | WEIGHT: 191 LBS

## 2017-10-27 PROCEDURE — 73000 X-RAY EXAM OF COLLAR BONE: CPT | Mod: RT

## 2017-10-27 PROCEDURE — 99214 OFFICE O/P EST MOD 30 MIN: CPT

## 2017-11-17 ENCOUNTER — APPOINTMENT (OUTPATIENT)
Dept: INTERNAL MEDICINE | Facility: CLINIC | Age: 82
End: 2017-11-17
Payer: MEDICARE

## 2017-11-17 LAB
INR PPP: 1.3 RATIO
POCT-PROTHROMBIN TIME: 16 SECS
QUALITY CONTROL: YES

## 2017-11-17 PROCEDURE — 85610 PROTHROMBIN TIME: CPT | Mod: QW

## 2017-11-17 PROCEDURE — 36415 COLL VENOUS BLD VENIPUNCTURE: CPT

## 2017-11-20 ENCOUNTER — APPOINTMENT (OUTPATIENT)
Dept: INTERNAL MEDICINE | Facility: CLINIC | Age: 82
End: 2017-11-20

## 2017-11-20 ENCOUNTER — APPOINTMENT (OUTPATIENT)
Dept: INTERNAL MEDICINE | Facility: CLINIC | Age: 82
End: 2017-11-20
Payer: MEDICARE

## 2017-11-20 ENCOUNTER — RESULT CHARGE (OUTPATIENT)
Age: 82
End: 2017-11-20

## 2017-11-20 VITALS
SYSTOLIC BLOOD PRESSURE: 122 MMHG | HEIGHT: 68 IN | BODY MASS INDEX: 29.1 KG/M2 | TEMPERATURE: 97.8 F | HEART RATE: 66 BPM | WEIGHT: 192 LBS | DIASTOLIC BLOOD PRESSURE: 56 MMHG | RESPIRATION RATE: 14 BRPM | OXYGEN SATURATION: 96 %

## 2017-11-20 DIAGNOSIS — I10 ESSENTIAL (PRIMARY) HYPERTENSION: ICD-10-CM

## 2017-11-20 LAB
INR PPP: 1.7 RATIO
POCT-PROTHROMBIN TIME: 20.2 SECS
QUALITY CONTROL: YES

## 2017-11-20 PROCEDURE — 36415 COLL VENOUS BLD VENIPUNCTURE: CPT

## 2017-11-20 PROCEDURE — 85610 PROTHROMBIN TIME: CPT | Mod: QW

## 2017-11-20 PROCEDURE — 99213 OFFICE O/P EST LOW 20 MIN: CPT | Mod: 25

## 2017-11-21 LAB
APPEARANCE: CLEAR
BACTERIA: ABNORMAL
BILIRUBIN URINE: NEGATIVE
BLOOD URINE: NEGATIVE
COLOR: YELLOW
GLUCOSE QUALITATIVE U: NEGATIVE MG/DL
HYALINE CASTS: 0 /LPF
KETONES URINE: NEGATIVE
LEUKOCYTE ESTERASE URINE: NEGATIVE
MICROSCOPIC-UA: NORMAL
NITRITE URINE: NEGATIVE
PH URINE: 5
PROTEIN URINE: 30 MG/DL
RED BLOOD CELLS URINE: 3 /HPF
SPECIFIC GRAVITY URINE: 1.02
SQUAMOUS EPITHELIAL CELLS: 3 /HPF
URINE COMMENTS: NORMAL
UROBILINOGEN URINE: NEGATIVE MG/DL
WHITE BLOOD CELLS URINE: 3 /HPF

## 2017-11-22 LAB — BACTERIA UR CULT: NORMAL

## 2017-11-24 ENCOUNTER — APPOINTMENT (OUTPATIENT)
Dept: INTERNAL MEDICINE | Facility: CLINIC | Age: 82
End: 2017-11-24
Payer: MEDICARE

## 2017-11-24 LAB
INR PPP: 2.3 RATIO
POCT-PROTHROMBIN TIME: 27.9 SECS

## 2017-11-24 PROCEDURE — 36415 COLL VENOUS BLD VENIPUNCTURE: CPT

## 2017-11-24 PROCEDURE — 85610 PROTHROMBIN TIME: CPT | Mod: QW

## 2017-11-28 ENCOUNTER — MEDICATION RENEWAL (OUTPATIENT)
Age: 82
End: 2017-11-28

## 2017-11-28 ENCOUNTER — LABORATORY RESULT (OUTPATIENT)
Age: 82
End: 2017-11-28

## 2017-11-29 ENCOUNTER — APPOINTMENT (OUTPATIENT)
Dept: INTERNAL MEDICINE | Facility: CLINIC | Age: 82
End: 2017-11-29
Payer: MEDICARE

## 2017-11-29 VITALS
WEIGHT: 195 LBS | BODY MASS INDEX: 30.61 KG/M2 | HEIGHT: 67.1 IN | TEMPERATURE: 97.8 F | HEART RATE: 64 BPM | DIASTOLIC BLOOD PRESSURE: 70 MMHG | OXYGEN SATURATION: 97 % | SYSTOLIC BLOOD PRESSURE: 124 MMHG

## 2017-11-29 PROCEDURE — 36415 COLL VENOUS BLD VENIPUNCTURE: CPT

## 2017-11-29 PROCEDURE — 99214 OFFICE O/P EST MOD 30 MIN: CPT | Mod: 25

## 2017-11-30 ENCOUNTER — RX RENEWAL (OUTPATIENT)
Age: 82
End: 2017-11-30

## 2017-12-01 LAB
25(OH)D3 SERPL-MCNC: 33.5 NG/ML
ALBUMIN SERPL ELPH-MCNC: 4.1 G/DL
ALP BLD-CCNC: 72 U/L
ALT SERPL-CCNC: 19 U/L
ANION GAP SERPL CALC-SCNC: 14 MMOL/L
AST SERPL-CCNC: 23 U/L
BILIRUB SERPL-MCNC: 0.4 MG/DL
BUN SERPL-MCNC: 24 MG/DL
CALCIUM SERPL-MCNC: 9 MG/DL
CHLORIDE SERPL-SCNC: 101 MMOL/L
CHOLEST SERPL-MCNC: 104 MG/DL
CHOLEST/HDLC SERPL: 2.3 RATIO
CO2 SERPL-SCNC: 23 MMOL/L
CREAT SERPL-MCNC: 1.04 MG/DL
GLUCOSE SERPL-MCNC: 111 MG/DL
HBA1C MFR BLD HPLC: 5.5 %
HDLC SERPL-MCNC: 46 MG/DL
LDLC SERPL CALC-MCNC: 47 MG/DL
POTASSIUM SERPL-SCNC: 4.7 MMOL/L
PROT SERPL-MCNC: 6.7 G/DL
SODIUM SERPL-SCNC: 138 MMOL/L
TRIGL SERPL-MCNC: 54 MG/DL
TSH SERPL-ACNC: 2.12 UIU/ML

## 2017-12-04 LAB
BASOPHILS # BLD AUTO: 0.04 K/UL
BASOPHILS NFR BLD AUTO: 0.7 %
EOSINOPHIL # BLD AUTO: 0.07 K/UL
EOSINOPHIL NFR BLD AUTO: 1.2 %
HCT VFR BLD CALC: 32.8 %
HGB BLD-MCNC: 11.1 G/DL
IMM GRANULOCYTES NFR BLD AUTO: 0.3 %
LYMPHOCYTES # BLD AUTO: 0.96 K/UL
LYMPHOCYTES NFR BLD AUTO: 15.9 %
MAN DIFF?: NORMAL
MCHC RBC-ENTMCNC: 32.5 PG
MCHC RBC-ENTMCNC: 33.8 GM/DL
MCV RBC AUTO: 95.9 FL
MONOCYTES # BLD AUTO: 0.83 K/UL
MONOCYTES NFR BLD AUTO: 13.7 %
NEUTROPHILS # BLD AUTO: 4.12 K/UL
NEUTROPHILS NFR BLD AUTO: 68.2 %
PLATELET # BLD AUTO: 60 K/UL
RBC # BLD: 3.42 M/UL
RBC # FLD: 15.8 %
WBC # FLD AUTO: 6.04 K/UL

## 2017-12-05 ENCOUNTER — MEDICATION RENEWAL (OUTPATIENT)
Age: 82
End: 2017-12-05

## 2017-12-06 ENCOUNTER — MEDICATION RENEWAL (OUTPATIENT)
Age: 82
End: 2017-12-06

## 2017-12-12 ENCOUNTER — RX RENEWAL (OUTPATIENT)
Age: 82
End: 2017-12-12

## 2017-12-14 ENCOUNTER — MEDICATION RENEWAL (OUTPATIENT)
Age: 82
End: 2017-12-14

## 2017-12-14 RX ORDER — EZETIMIBE AND SIMVASTATIN 10; 40 MG/1; MG/1
10-40 TABLET ORAL
Qty: 90 | Refills: 3 | Status: ACTIVE | COMMUNITY
Start: 2017-11-03 | End: 1900-01-01

## 2017-12-15 ENCOUNTER — APPOINTMENT (OUTPATIENT)
Dept: ORTHOPEDIC SURGERY | Facility: CLINIC | Age: 82
End: 2017-12-15

## 2017-12-15 ENCOUNTER — APPOINTMENT (OUTPATIENT)
Dept: INTERNAL MEDICINE | Facility: CLINIC | Age: 82
End: 2017-12-15
Payer: MEDICARE

## 2017-12-15 ENCOUNTER — APPOINTMENT (OUTPATIENT)
Dept: ORTHOPEDIC SURGERY | Facility: CLINIC | Age: 82
End: 2017-12-15
Payer: MEDICARE

## 2017-12-15 DIAGNOSIS — S42.024A NONDISPLACED FRACTURE OF SHAFT OF RIGHT CLAVICLE, INITIAL ENCOUNTER FOR CLOSED FRACTURE: ICD-10-CM

## 2017-12-15 LAB
INR PPP: 2.4 RATIO
POCT-PROTHROMBIN TIME: 29 SECS
QUALITY CONTROL: YES

## 2017-12-15 PROCEDURE — 99214 OFFICE O/P EST MOD 30 MIN: CPT

## 2017-12-15 PROCEDURE — 85610 PROTHROMBIN TIME: CPT | Mod: QW

## 2017-12-15 PROCEDURE — 36415 COLL VENOUS BLD VENIPUNCTURE: CPT

## 2017-12-15 PROCEDURE — 73000 X-RAY EXAM OF COLLAR BONE: CPT | Mod: RT

## 2018-01-07 ENCOUNTER — MEDICATION RENEWAL (OUTPATIENT)
Age: 83
End: 2018-01-07

## 2018-01-08 RX ORDER — POTASSIUM CHLORIDE 750 MG/1
10 TABLET, EXTENDED RELEASE ORAL
Qty: 90 | Refills: 1 | Status: ACTIVE | COMMUNITY
Start: 2017-02-03 | End: 1900-01-01

## 2018-01-11 ENCOUNTER — APPOINTMENT (OUTPATIENT)
Dept: INTERNAL MEDICINE | Facility: CLINIC | Age: 83
End: 2018-01-11
Payer: MEDICARE

## 2018-01-11 ENCOUNTER — LABORATORY RESULT (OUTPATIENT)
Age: 83
End: 2018-01-11

## 2018-01-11 LAB
INR PPP: 3 RATIO
POCT-PROTHROMBIN TIME: 35.5 SECS
QUALITY CONTROL: YES

## 2018-01-11 PROCEDURE — 85610 PROTHROMBIN TIME: CPT | Mod: QW

## 2018-01-11 PROCEDURE — 36415 COLL VENOUS BLD VENIPUNCTURE: CPT

## 2018-01-12 LAB
BASOPHILS # BLD AUTO: 0.04 K/UL
BASOPHILS NFR BLD AUTO: 0.6 %
EOSINOPHIL # BLD AUTO: 0.03 K/UL
EOSINOPHIL NFR BLD AUTO: 0.5 %
HCT VFR BLD CALC: 34.5 %
HGB BLD-MCNC: 11.3 G/DL
IMM GRANULOCYTES NFR BLD AUTO: 0.2 %
LYMPHOCYTES # BLD AUTO: 0.95 K/UL
LYMPHOCYTES NFR BLD AUTO: 15.3 %
MAN DIFF?: NORMAL
MCHC RBC-ENTMCNC: 31.4 PG
MCHC RBC-ENTMCNC: 32.8 GM/DL
MCV RBC AUTO: 95.8 FL
MONOCYTES # BLD AUTO: 0.98 K/UL
MONOCYTES NFR BLD AUTO: 15.8 %
NEUTROPHILS # BLD AUTO: 4.18 K/UL
NEUTROPHILS NFR BLD AUTO: 67.6 %
PLATELET # BLD AUTO: 69 K/UL
RBC # BLD: 3.6 M/UL
RBC # FLD: 15.6 %
WBC # FLD AUTO: 6.19 K/UL

## 2018-01-16 ENCOUNTER — APPOINTMENT (OUTPATIENT)
Dept: INTERNAL MEDICINE | Facility: CLINIC | Age: 83
End: 2018-01-16

## 2018-01-17 ENCOUNTER — MEDICATION RENEWAL (OUTPATIENT)
Age: 83
End: 2018-01-17

## 2018-01-19 ENCOUNTER — APPOINTMENT (OUTPATIENT)
Dept: INTERNAL MEDICINE | Facility: CLINIC | Age: 83
End: 2018-01-19
Payer: MEDICARE

## 2018-01-19 VITALS
WEIGHT: 190 LBS | DIASTOLIC BLOOD PRESSURE: 74 MMHG | TEMPERATURE: 97.6 F | OXYGEN SATURATION: 97 % | BODY MASS INDEX: 29.47 KG/M2 | HEART RATE: 76 BPM | SYSTOLIC BLOOD PRESSURE: 136 MMHG | HEIGHT: 67.5 IN

## 2018-01-19 PROCEDURE — 99213 OFFICE O/P EST LOW 20 MIN: CPT

## 2018-01-19 RX ORDER — METHYLPREDNISOLONE 4 MG/1
4 TABLET ORAL
Qty: 1 | Refills: 0 | Status: DISCONTINUED | COMMUNITY
Start: 2017-12-05 | End: 2018-01-19

## 2018-01-22 LAB
FLUBV RNA SPEC QL NAA+PROBE: DETECTED
RAPID RVP RESULT: DETECTED

## 2018-02-09 ENCOUNTER — MEDICATION RENEWAL (OUTPATIENT)
Age: 83
End: 2018-02-09

## 2018-02-12 ENCOUNTER — APPOINTMENT (OUTPATIENT)
Dept: INTERNAL MEDICINE | Facility: CLINIC | Age: 83
End: 2018-02-12
Payer: MEDICARE

## 2018-02-12 ENCOUNTER — MEDICATION RENEWAL (OUTPATIENT)
Age: 83
End: 2018-02-12

## 2018-02-12 ENCOUNTER — RX RENEWAL (OUTPATIENT)
Age: 83
End: 2018-02-12

## 2018-02-12 ENCOUNTER — LABORATORY RESULT (OUTPATIENT)
Age: 83
End: 2018-02-12

## 2018-02-12 VITALS
WEIGHT: 191 LBS | TEMPERATURE: 97.8 F | SYSTOLIC BLOOD PRESSURE: 170 MMHG | OXYGEN SATURATION: 96 % | BODY MASS INDEX: 28.95 KG/M2 | HEART RATE: 58 BPM | DIASTOLIC BLOOD PRESSURE: 82 MMHG | HEIGHT: 68.25 IN

## 2018-02-12 DIAGNOSIS — E87.1 HYPO-OSMOLALITY AND HYPONATREMIA: ICD-10-CM

## 2018-02-12 DIAGNOSIS — R19.7 DIARRHEA, UNSPECIFIED: ICD-10-CM

## 2018-02-12 DIAGNOSIS — Z92.29 PERSONAL HISTORY OF OTHER DRUG THERAPY: ICD-10-CM

## 2018-02-12 DIAGNOSIS — J06.9 ACUTE UPPER RESPIRATORY INFECTION, UNSPECIFIED: ICD-10-CM

## 2018-02-12 DIAGNOSIS — R21 RASH AND OTHER NONSPECIFIC SKIN ERUPTION: ICD-10-CM

## 2018-02-12 DIAGNOSIS — Z20.828 CONTACT WITH AND (SUSPECTED) EXPOSURE TO OTHER VIRAL COMMUNICABLE DISEASES: ICD-10-CM

## 2018-02-12 DIAGNOSIS — J10.1 INFLUENZA DUE TO OTHER IDENTIFIED INFLUENZA VIRUS WITH OTHER RESPIRATORY MANIFESTATIONS: ICD-10-CM

## 2018-02-12 LAB
INR PPP: 4.2 RATIO
POCT-PROTHROMBIN TIME: 49.9 SECS
QUALITY CONTROL: YES

## 2018-02-12 PROCEDURE — 99214 OFFICE O/P EST MOD 30 MIN: CPT | Mod: 25

## 2018-02-12 PROCEDURE — 85610 PROTHROMBIN TIME: CPT | Mod: QW

## 2018-02-12 PROCEDURE — 36415 COLL VENOUS BLD VENIPUNCTURE: CPT

## 2018-02-12 RX ORDER — OSELTAMIVIR PHOSPHATE 75 MG/1
75 CAPSULE ORAL TWICE DAILY
Qty: 10 | Refills: 0 | Status: DISCONTINUED | COMMUNITY
Start: 2018-01-22 | End: 2018-02-12

## 2018-02-12 RX ORDER — CLOTRIMAZOLE 10 MG/G
1 CREAM TOPICAL TWICE DAILY
Qty: 1 | Refills: 0 | Status: ACTIVE | COMMUNITY
Start: 2018-02-12 | End: 1900-01-01

## 2018-02-12 RX ORDER — BENZONATATE 100 MG/1
100 CAPSULE ORAL EVERY 8 HOURS
Qty: 30 | Refills: 1 | Status: DISCONTINUED | COMMUNITY
Start: 2017-05-04 | End: 2018-02-12

## 2018-02-16 ENCOUNTER — APPOINTMENT (OUTPATIENT)
Dept: INTERNAL MEDICINE | Facility: CLINIC | Age: 83
End: 2018-02-16
Payer: MEDICARE

## 2018-02-16 ENCOUNTER — MEDICATION RENEWAL (OUTPATIENT)
Age: 83
End: 2018-02-16

## 2018-02-16 LAB
25(OH)D3 SERPL-MCNC: 36.6 NG/ML
ALBUMIN SERPL ELPH-MCNC: 4.1 G/DL
ALP BLD-CCNC: 68 U/L
ALT SERPL-CCNC: 18 U/L
ANION GAP SERPL CALC-SCNC: 14 MMOL/L
AST SERPL-CCNC: 30 U/L
BASOPHILS # BLD AUTO: 0.04 K/UL
BASOPHILS NFR BLD AUTO: 0.7 %
BILIRUB SERPL-MCNC: 0.6 MG/DL
BUN SERPL-MCNC: 16 MG/DL
CALCIUM SERPL-MCNC: 9.3 MG/DL
CHLORIDE SERPL-SCNC: 102 MMOL/L
CHOLEST SERPL-MCNC: 98 MG/DL
CHOLEST/HDLC SERPL: 2.2 RATIO
CO2 SERPL-SCNC: 22 MMOL/L
CREAT SERPL-MCNC: 0.92 MG/DL
EOSINOPHIL # BLD AUTO: 0.12 K/UL
EOSINOPHIL NFR BLD AUTO: 2.2 %
GLUCOSE SERPL-MCNC: 89 MG/DL
HBA1C MFR BLD HPLC: 5.5 %
HCT VFR BLD CALC: 33.1 %
HDLC SERPL-MCNC: 45 MG/DL
HGB BLD-MCNC: 11 G/DL
IMM GRANULOCYTES NFR BLD AUTO: 0 %
INR PPP: 2 RATIO
LDLC SERPL CALC-MCNC: 41 MG/DL
LYMPHOCYTES # BLD AUTO: 0.97 K/UL
LYMPHOCYTES NFR BLD AUTO: 17.7 %
MAN DIFF?: NORMAL
MCHC RBC-ENTMCNC: 31.7 PG
MCHC RBC-ENTMCNC: 33.2 GM/DL
MCV RBC AUTO: 95.4 FL
MONOCYTES # BLD AUTO: 0.73 K/UL
MONOCYTES NFR BLD AUTO: 13.3 %
NEUTROPHILS # BLD AUTO: 3.63 K/UL
NEUTROPHILS NFR BLD AUTO: 66.1 %
PLATELET # BLD AUTO: 43 K/UL
POCT-PROTHROMBIN TIME: 23.4 SECS
POTASSIUM SERPL-SCNC: 4.3 MMOL/L
PROT SERPL-MCNC: 6.9 G/DL
QUALITY CONTROL: YES
RBC # BLD: 3.47 M/UL
RBC # FLD: 15.5 %
SODIUM SERPL-SCNC: 138 MMOL/L
TRIGL SERPL-MCNC: 60 MG/DL
TSH SERPL-ACNC: 2.72 UIU/ML
URATE SERPL-MCNC: 5.7 MG/DL
WBC # FLD AUTO: 5.49 K/UL

## 2018-02-16 PROCEDURE — 85610 PROTHROMBIN TIME: CPT | Mod: QW

## 2018-02-16 PROCEDURE — 36415 COLL VENOUS BLD VENIPUNCTURE: CPT

## 2018-02-20 LAB
BACTERIA STL CULT: NORMAL
DEPRECATED O AND P PREP STL: NORMAL

## 2018-02-22 ENCOUNTER — APPOINTMENT (OUTPATIENT)
Dept: INTERNAL MEDICINE | Facility: CLINIC | Age: 83
End: 2018-02-22
Payer: MEDICARE

## 2018-02-22 LAB
INR PPP: 3.1 RATIO
POCT-PROTHROMBIN TIME: 37.3 SECS
QUALITY CONTROL: YES

## 2018-02-22 PROCEDURE — 36415 COLL VENOUS BLD VENIPUNCTURE: CPT

## 2018-02-22 PROCEDURE — 85610 PROTHROMBIN TIME: CPT | Mod: QW

## 2018-02-26 ENCOUNTER — MEDICATION RENEWAL (OUTPATIENT)
Age: 83
End: 2018-02-26

## 2018-03-01 ENCOUNTER — APPOINTMENT (OUTPATIENT)
Dept: INTERNAL MEDICINE | Facility: CLINIC | Age: 83
End: 2018-03-01
Payer: MEDICARE

## 2018-03-01 VITALS
TEMPERATURE: 97.9 F | SYSTOLIC BLOOD PRESSURE: 170 MMHG | HEART RATE: 64 BPM | OXYGEN SATURATION: 96 % | DIASTOLIC BLOOD PRESSURE: 80 MMHG

## 2018-03-01 DIAGNOSIS — R29.6 REPEATED FALLS: ICD-10-CM

## 2018-03-01 DIAGNOSIS — R53.82 CHRONIC FATIGUE, UNSPECIFIED: ICD-10-CM

## 2018-03-01 LAB
INR PPP: 3.6 RATIO
POCT-PROTHROMBIN TIME: 42.7 SECS
QUALITY CONTROL: YES

## 2018-03-01 PROCEDURE — 36415 COLL VENOUS BLD VENIPUNCTURE: CPT

## 2018-03-01 PROCEDURE — 85610 PROTHROMBIN TIME: CPT | Mod: QW

## 2018-03-01 PROCEDURE — 99214 OFFICE O/P EST MOD 30 MIN: CPT | Mod: 25

## 2018-03-08 ENCOUNTER — LABORATORY RESULT (OUTPATIENT)
Age: 83
End: 2018-03-08

## 2018-03-08 ENCOUNTER — APPOINTMENT (OUTPATIENT)
Dept: INTERNAL MEDICINE | Facility: CLINIC | Age: 83
End: 2018-03-08
Payer: MEDICARE

## 2018-03-08 LAB
INR PPP: 2.4 RATIO
POCT-PROTHROMBIN TIME: 28.6 SECS
QUALITY CONTROL: YES

## 2018-03-08 PROCEDURE — 85610 PROTHROMBIN TIME: CPT | Mod: QW

## 2018-03-08 PROCEDURE — 36415 COLL VENOUS BLD VENIPUNCTURE: CPT

## 2018-03-08 RX ORDER — WALKER
EACH MISCELLANEOUS
Qty: 1 | Refills: 0 | Status: ACTIVE | OUTPATIENT
Start: 2018-03-08

## 2018-03-09 LAB
BASOPHILS # BLD AUTO: 0.05 K/UL
BASOPHILS NFR BLD AUTO: 0.9 %
EOSINOPHIL # BLD AUTO: 0.13 K/UL
EOSINOPHIL NFR BLD AUTO: 2.2 %
HCT VFR BLD CALC: 32 %
HGB BLD-MCNC: 10.9 G/DL
IMM GRANULOCYTES NFR BLD AUTO: 0.2 %
LYMPHOCYTES # BLD AUTO: 0.95 K/UL
LYMPHOCYTES NFR BLD AUTO: 16.4 %
MAN DIFF?: NORMAL
MCHC RBC-ENTMCNC: 32.3 PG
MCHC RBC-ENTMCNC: 34.1 GM/DL
MCV RBC AUTO: 95 FL
MONOCYTES # BLD AUTO: 0.73 K/UL
MONOCYTES NFR BLD AUTO: 12.6 %
NEUTROPHILS # BLD AUTO: 3.93 K/UL
NEUTROPHILS NFR BLD AUTO: 67.7 %
PLATELET # BLD AUTO: 50 K/UL
RBC # BLD: 3.37 M/UL
RBC # FLD: 16.1 %
WBC # FLD AUTO: 5.8 K/UL

## 2018-03-13 ENCOUNTER — MEDICATION RENEWAL (OUTPATIENT)
Age: 83
End: 2018-03-13

## 2018-03-14 ENCOUNTER — RX RENEWAL (OUTPATIENT)
Age: 83
End: 2018-03-14

## 2018-03-20 ENCOUNTER — NON-APPOINTMENT (OUTPATIENT)
Age: 83
End: 2018-03-20

## 2018-03-20 ENCOUNTER — APPOINTMENT (OUTPATIENT)
Dept: CARDIOLOGY | Facility: CLINIC | Age: 83
End: 2018-03-20
Payer: MEDICARE

## 2018-03-20 VITALS
BODY MASS INDEX: 29.1 KG/M2 | RESPIRATION RATE: 16 BRPM | HEART RATE: 60 BPM | WEIGHT: 192 LBS | SYSTOLIC BLOOD PRESSURE: 158 MMHG | HEIGHT: 68 IN | DIASTOLIC BLOOD PRESSURE: 70 MMHG

## 2018-03-20 LAB — INR PPP: 2.5 RATIO

## 2018-03-20 PROCEDURE — 99215 OFFICE O/P EST HI 40 MIN: CPT

## 2018-03-20 PROCEDURE — 85610 PROTHROMBIN TIME: CPT | Mod: QW

## 2018-03-20 PROCEDURE — 93000 ELECTROCARDIOGRAM COMPLETE: CPT

## 2018-03-20 RX ORDER — SENNOSIDES 8.6 MG TABLETS 8.6 MG/1
TABLET ORAL
Refills: 0 | Status: ACTIVE | COMMUNITY

## 2018-03-20 RX ORDER — DOCUSATE SODIUM 100 MG/1
100 CAPSULE ORAL 3 TIMES DAILY
Refills: 0 | Status: ACTIVE | COMMUNITY

## 2018-03-20 RX ORDER — CETIRIZINE HCL 10 MG
10 TABLET ORAL DAILY
Refills: 0 | Status: ACTIVE | COMMUNITY

## 2018-03-20 RX ORDER — OSELTAMIVIR PHOSPHATE 75 MG/1
75 CAPSULE ORAL
Qty: 10 | Refills: 0 | Status: COMPLETED | COMMUNITY
Start: 2018-02-12 | End: 2018-03-20

## 2018-03-20 RX ORDER — FUROSEMIDE 20 MG/1
20 TABLET ORAL
Qty: 90 | Refills: 1 | Status: COMPLETED | COMMUNITY
Start: 2017-02-03 | End: 2018-03-20

## 2018-03-20 RX ORDER — CEPHALEXIN 500 MG/1
500 CAPSULE ORAL TWICE DAILY
Qty: 14 | Refills: 0 | Status: DISCONTINUED | COMMUNITY
Start: 2018-03-01 | End: 2018-03-20

## 2018-03-26 ENCOUNTER — APPOINTMENT (OUTPATIENT)
Dept: INTERNAL MEDICINE | Facility: CLINIC | Age: 83
End: 2018-03-26
Payer: MEDICARE

## 2018-03-26 LAB
INR PPP: 2.3 RATIO
POCT-PROTHROMBIN TIME: 27.9 SECS
QUALITY CONTROL: YES

## 2018-03-26 PROCEDURE — 85610 PROTHROMBIN TIME: CPT | Mod: QW

## 2018-03-26 PROCEDURE — 36415 COLL VENOUS BLD VENIPUNCTURE: CPT

## 2018-03-28 LAB
ALBUMIN SERPL ELPH-MCNC: 3.9 G/DL
ALP BLD-CCNC: 67 U/L
ALT SERPL-CCNC: 18 U/L
ANION GAP SERPL CALC-SCNC: 14 MMOL/L
AST SERPL-CCNC: 23 U/L
BASOPHILS # BLD AUTO: 0.06 K/UL
BASOPHILS NFR BLD AUTO: 1.1 %
BILIRUB SERPL-MCNC: 0.6 MG/DL
BUN SERPL-MCNC: 20 MG/DL
CALCIUM SERPL-MCNC: 9 MG/DL
CHLORIDE SERPL-SCNC: 102 MMOL/L
CO2 SERPL-SCNC: 23 MMOL/L
CREAT SERPL-MCNC: 0.94 MG/DL
EOSINOPHIL # BLD AUTO: 0.05 K/UL
EOSINOPHIL NFR BLD AUTO: 0.9 %
GLUCOSE SERPL-MCNC: 111 MG/DL
HCT VFR BLD CALC: 32 %
HGB BLD-MCNC: 10.9 G/DL
IMM GRANULOCYTES NFR BLD AUTO: 0 %
LYMPHOCYTES # BLD AUTO: 0.95 K/UL
LYMPHOCYTES NFR BLD AUTO: 17 %
MAN DIFF?: NORMAL
MCHC RBC-ENTMCNC: 32.1 PG
MCHC RBC-ENTMCNC: 34.1 GM/DL
MCV RBC AUTO: 94.1 FL
MONOCYTES # BLD AUTO: 0.8 K/UL
MONOCYTES NFR BLD AUTO: 14.3 %
NEUTROPHILS # BLD AUTO: 3.72 K/UL
NEUTROPHILS NFR BLD AUTO: 66.7 %
PLATELET # BLD AUTO: NORMAL K/UL
POTASSIUM SERPL-SCNC: 4.1 MMOL/L
PROT SERPL-MCNC: 6.6 G/DL
RBC # BLD: 3.4 M/UL
RBC # FLD: 16.1 %
SODIUM SERPL-SCNC: 139 MMOL/L
WBC # FLD AUTO: 5.58 K/UL

## 2018-04-03 ENCOUNTER — MEDICATION RENEWAL (OUTPATIENT)
Age: 83
End: 2018-04-03

## 2018-04-18 ENCOUNTER — MEDICATION RENEWAL (OUTPATIENT)
Age: 83
End: 2018-04-18

## 2018-04-23 ENCOUNTER — MEDICATION RENEWAL (OUTPATIENT)
Age: 83
End: 2018-04-23

## 2018-04-28 ENCOUNTER — MEDICATION RENEWAL (OUTPATIENT)
Age: 83
End: 2018-04-28

## 2018-05-01 ENCOUNTER — LABORATORY RESULT (OUTPATIENT)
Age: 83
End: 2018-05-01

## 2018-05-01 ENCOUNTER — APPOINTMENT (OUTPATIENT)
Dept: INTERNAL MEDICINE | Facility: CLINIC | Age: 83
End: 2018-05-01
Payer: MEDICARE

## 2018-05-01 DIAGNOSIS — Z87.09 PERSONAL HISTORY OF OTHER DISEASES OF THE RESPIRATORY SYSTEM: ICD-10-CM

## 2018-05-01 LAB
INR PPP: 2.6 RATIO
POCT-PROTHROMBIN TIME: 30.9 SECS
QUALITY CONTROL: YES
S PYO AG SPEC QL IA: NEGATIVE

## 2018-05-01 PROCEDURE — 85610 PROTHROMBIN TIME: CPT | Mod: QW

## 2018-05-01 PROCEDURE — 36415 COLL VENOUS BLD VENIPUNCTURE: CPT

## 2018-05-01 PROCEDURE — 87880 STREP A ASSAY W/OPTIC: CPT | Mod: QW

## 2018-05-04 LAB
ALBUMIN SERPL ELPH-MCNC: 4.1 G/DL
ALP BLD-CCNC: 69 U/L
ALT SERPL-CCNC: 17 U/L
ANION GAP SERPL CALC-SCNC: 14 MMOL/L
AST SERPL-CCNC: 23 U/L
BASOPHILS # BLD AUTO: 0.04 K/UL
BASOPHILS NFR BLD AUTO: 0.7 %
BILIRUB SERPL-MCNC: 0.8 MG/DL
BUN SERPL-MCNC: 18 MG/DL
CALCIUM SERPL-MCNC: 9.4 MG/DL
CHLORIDE SERPL-SCNC: 100 MMOL/L
CO2 SERPL-SCNC: 25 MMOL/L
CREAT SERPL-MCNC: 0.84 MG/DL
EOSINOPHIL # BLD AUTO: 0.04 K/UL
EOSINOPHIL NFR BLD AUTO: 0.7 %
GLUCOSE SERPL-MCNC: 90 MG/DL
HCT VFR BLD CALC: 34.2 %
HGB BLD-MCNC: 11 G/DL
IMM GRANULOCYTES NFR BLD AUTO: 0.2 %
LYMPHOCYTES # BLD AUTO: 0.93 K/UL
LYMPHOCYTES NFR BLD AUTO: 16 %
MAN DIFF?: NORMAL
MCHC RBC-ENTMCNC: 31.1 PG
MCHC RBC-ENTMCNC: 32.2 GM/DL
MCV RBC AUTO: 96.6 FL
MONOCYTES # BLD AUTO: 0.72 K/UL
MONOCYTES NFR BLD AUTO: 12.4 %
NEUTROPHILS # BLD AUTO: 4.07 K/UL
NEUTROPHILS NFR BLD AUTO: 70 %
PLATELET # BLD AUTO: NORMAL K/UL
POTASSIUM SERPL-SCNC: 4.5 MMOL/L
PROT SERPL-MCNC: 7.1 G/DL
RBC # BLD: 3.54 M/UL
RBC # FLD: 15.9 %
SODIUM SERPL-SCNC: 139 MMOL/L
WBC # FLD AUTO: 5.81 K/UL

## 2018-05-22 ENCOUNTER — MEDICATION RENEWAL (OUTPATIENT)
Age: 83
End: 2018-05-22

## 2018-05-30 ENCOUNTER — LABORATORY RESULT (OUTPATIENT)
Age: 83
End: 2018-05-30

## 2018-05-30 ENCOUNTER — APPOINTMENT (OUTPATIENT)
Dept: INTERNAL MEDICINE | Facility: CLINIC | Age: 83
End: 2018-05-30
Payer: MEDICARE

## 2018-05-30 LAB
INR PPP: 2.7 RATIO
POCT-PROTHROMBIN TIME: 32.6 SECS
QUALITY CONTROL: YES

## 2018-05-30 PROCEDURE — 36415 COLL VENOUS BLD VENIPUNCTURE: CPT

## 2018-05-30 PROCEDURE — 85610 PROTHROMBIN TIME: CPT | Mod: QW

## 2018-06-04 LAB
BASOPHILS # BLD AUTO: 0.05 K/UL
BASOPHILS NFR BLD AUTO: 0.9 %
EOSINOPHIL # BLD AUTO: 0.07 K/UL
EOSINOPHIL NFR BLD AUTO: 1.3 %
HCT VFR BLD CALC: 33.9 %
HGB BLD-MCNC: 11.1 G/DL
IMM GRANULOCYTES NFR BLD AUTO: 0.4 %
LYMPHOCYTES # BLD AUTO: 1.13 K/UL
LYMPHOCYTES NFR BLD AUTO: 20.2 %
MAN DIFF?: NORMAL
MCHC RBC-ENTMCNC: 31.6 PG
MCHC RBC-ENTMCNC: 32.7 GM/DL
MCV RBC AUTO: 96.6 FL
MONOCYTES # BLD AUTO: 0.69 K/UL
MONOCYTES NFR BLD AUTO: 12.3 %
NEUTROPHILS # BLD AUTO: 3.63 K/UL
NEUTROPHILS NFR BLD AUTO: 64.9 %
PLATELET # BLD AUTO: 55 K/UL
RBC # BLD: 3.51 M/UL
RBC # FLD: 16.4 %
WBC # FLD AUTO: 5.59 K/UL

## 2018-06-11 ENCOUNTER — APPOINTMENT (OUTPATIENT)
Dept: CARDIOLOGY | Facility: CLINIC | Age: 83
End: 2018-06-11
Payer: MEDICARE

## 2018-06-11 ENCOUNTER — RX RENEWAL (OUTPATIENT)
Age: 83
End: 2018-06-11

## 2018-06-15 NOTE — H&P ADULT - MS EXT PE MLT D E PC
59F w Anxiety, chronic low back pain s/p  L3-L4 lumbar laminectomy and fusion  in 2011, thyroid nodule, ?aortic valve insufficiency a/w progressive low back pain that radiates down the right leg now s/p removal of lumbar hardware  L2-5 decompression  L2- 3  L4 -5 posterior lumbar fusion  L2 - 5 Fusion c/b blood loss anemia w/occ hypotension, and persistent fevers no clubbing/no cyanosis/pedal edema

## 2018-06-25 ENCOUNTER — NON-APPOINTMENT (OUTPATIENT)
Age: 83
End: 2018-06-25

## 2018-06-25 ENCOUNTER — APPOINTMENT (OUTPATIENT)
Dept: CARDIOLOGY | Facility: CLINIC | Age: 83
End: 2018-06-25
Payer: MEDICARE

## 2018-06-25 VITALS
HEART RATE: 60 BPM | OXYGEN SATURATION: 96 % | WEIGHT: 190 LBS | SYSTOLIC BLOOD PRESSURE: 116 MMHG | DIASTOLIC BLOOD PRESSURE: 77 MMHG | BODY MASS INDEX: 28.89 KG/M2

## 2018-06-25 DIAGNOSIS — I48.91 UNSPECIFIED ATRIAL FIBRILLATION: ICD-10-CM

## 2018-06-25 DIAGNOSIS — I35.9 NONRHEUMATIC AORTIC VALVE DISORDER, UNSPECIFIED: ICD-10-CM

## 2018-06-25 DIAGNOSIS — I25.10 ATHEROSCLEROTIC HEART DISEASE OF NATIVE CORONARY ARTERY W/OUT ANGINA PECTORIS: ICD-10-CM

## 2018-06-25 PROCEDURE — 99215 OFFICE O/P EST HI 40 MIN: CPT

## 2018-06-25 PROCEDURE — 93000 ELECTROCARDIOGRAM COMPLETE: CPT

## 2018-06-25 RX ORDER — FUROSEMIDE 20 MG/1
20 TABLET ORAL
Qty: 270 | Refills: 1 | Status: ACTIVE | COMMUNITY
Start: 1900-01-01 | End: 1900-01-01

## 2018-06-27 ENCOUNTER — APPOINTMENT (OUTPATIENT)
Dept: INTERNAL MEDICINE | Facility: CLINIC | Age: 83
End: 2018-06-27
Payer: MEDICARE

## 2018-06-27 LAB
INR PPP: 2.8 RATIO
POCT-PROTHROMBIN TIME: 33.5 SECS
QUALITY CONTROL: YES

## 2018-06-27 PROCEDURE — 36415 COLL VENOUS BLD VENIPUNCTURE: CPT

## 2018-06-27 PROCEDURE — 85610 PROTHROMBIN TIME: CPT | Mod: QW

## 2018-07-02 ENCOUNTER — RX RENEWAL (OUTPATIENT)
Age: 83
End: 2018-07-02

## 2018-07-02 RX ORDER — PANTOPRAZOLE 40 MG/1
40 TABLET, DELAYED RELEASE ORAL
Qty: 30 | Refills: 3 | Status: ACTIVE | COMMUNITY
Start: 2017-11-03 | End: 1900-01-01

## 2018-07-16 ENCOUNTER — RX RENEWAL (OUTPATIENT)
Age: 83
End: 2018-07-16

## 2018-07-16 RX ORDER — ALLOPURINOL 100 MG/1
100 TABLET ORAL
Qty: 30 | Refills: 0 | Status: ACTIVE | COMMUNITY
Start: 2017-11-28 | End: 1900-01-01

## 2018-07-25 ENCOUNTER — LABORATORY RESULT (OUTPATIENT)
Age: 83
End: 2018-07-25

## 2018-07-25 ENCOUNTER — APPOINTMENT (OUTPATIENT)
Dept: INTERNAL MEDICINE | Facility: CLINIC | Age: 83
End: 2018-07-25
Payer: MEDICARE

## 2018-07-25 VITALS
HEART RATE: 60 BPM | SYSTOLIC BLOOD PRESSURE: 140 MMHG | TEMPERATURE: 97.7 F | BODY MASS INDEX: 28.79 KG/M2 | HEIGHT: 68 IN | WEIGHT: 190 LBS | OXYGEN SATURATION: 98 % | DIASTOLIC BLOOD PRESSURE: 60 MMHG

## 2018-07-25 DIAGNOSIS — R41.3 OTHER AMNESIA: ICD-10-CM

## 2018-07-25 DIAGNOSIS — F32.9 MAJOR DEPRESSIVE DISORDER, SINGLE EPISODE, UNSPECIFIED: ICD-10-CM

## 2018-07-25 PROBLEM — I35.0 NONRHEUMATIC AORTIC (VALVE) STENOSIS: Chronic | Status: ACTIVE | Noted: 2017-09-29

## 2018-07-25 PROBLEM — I25.10 ATHEROSCLEROTIC HEART DISEASE OF NATIVE CORONARY ARTERY WITHOUT ANGINA PECTORIS: Chronic | Status: ACTIVE | Noted: 2017-09-29

## 2018-07-25 PROBLEM — D69.3 IMMUNE THROMBOCYTOPENIC PURPURA: Chronic | Status: ACTIVE | Noted: 2017-09-29

## 2018-07-25 LAB
INR PPP: 2.4 RATIO
POCT-PROTHROMBIN TIME: 29.2 SECS
QUALITY CONTROL: YES

## 2018-07-25 PROCEDURE — 99214 OFFICE O/P EST MOD 30 MIN: CPT | Mod: 25

## 2018-07-25 PROCEDURE — 85610 PROTHROMBIN TIME: CPT | Mod: QW

## 2018-07-25 PROCEDURE — 36415 COLL VENOUS BLD VENIPUNCTURE: CPT

## 2018-07-26 NOTE — HISTORY OF PRESENT ILLNESS
[Family Member] : family member [FreeTextEntry1] : Comes in for routine medical f/u visit. [de-identified] : Family reports:\par Tends to sleep a lot.\par Very bethea and cranky.\par Can be demanding and paranoid.\par Contradicts himself.\par Memory extremely poor.\par Uncooperative.\par Edema a bit better - wears compression hose.\par Normal BM/UO.\par Denies abdominal pain or n/v.\par Appetite okay.\par Denies chest pain, cough, hemoptysis, fever, SOB.\par Complains that he is not strong and that his walking is poor.\par Not driving but claims that he confined to home and unable to walk outdoors even to park.\par Refuses home PT.\par Seems depressed and resigned.

## 2018-07-26 NOTE — REVIEW OF SYSTEMS
[Vision Problems] : vision problems [Lower Ext Edema] : lower extremity edema [Joint Pain] : joint pain [Back Pain] : back pain [Confusion] : confusion [Unsteady Walk] : ataxia [Memory Loss] : memory loss [Insomnia] : insomnia [Depression] : depression [Easy Bruising] : easy bruising [Negative] : Heme/Lymph

## 2018-07-26 NOTE — HEALTH RISK ASSESSMENT
[3] : 2) Feeling down, depressed, or hopeless for nearly every day (3) [Any fall with injury in past year] : Patient reported fall with injury in the past year [] : No [de-identified] : none [de-identified] : cardiology [[id number]] : 6 [[id number]] : 22

## 2018-07-26 NOTE — PHYSICAL EXAM
[No Acute Distress] : no acute distress [Well Nourished] : well nourished [Well Developed] : well developed [Well-Appearing] : well-appearing [Normal Voice/Communication] : normal voice/communication [Normal Sclera/Conjunctiva] : normal sclera/conjunctiva [PERRL] : pupils equal round and reactive to light [EOMI] : extraocular movements intact [Normal Outer Ear/Nose] : the outer ears and nose were normal in appearance [Normal Oropharynx] : the oropharynx was normal [No JVD] : no jugular venous distention [Supple] : supple [No Respiratory Distress] : no respiratory distress  [Clear to Auscultation] : lungs were clear to auscultation bilaterally [No Accessory Muscle Use] : no accessory muscle use [Normal Rate] : normal rate  [Regular Rhythm] : with a regular rhythm [Normal S1, S2] : normal S1 and S2 [No Extremity Clubbing/Cyanosis] : no extremity clubbing/cyanosis [Soft] : abdomen soft [Non Tender] : non-tender [Normal Bowel Sounds] : normal bowel sounds [No CVA Tenderness] : no CVA  tenderness [No Spinal Tenderness] : no spinal tenderness [No Rash] : no rash [Normal Gait] : normal gait [Coordination Grossly Intact] : coordination grossly intact [No Focal Deficits] : no focal deficits [Normal Affect] : the affect was normal [Alert and Oriented x3] : oriented to person, place, and time [de-identified] : No ST [de-identified] : no stridor [de-identified] : R=16 [de-identified] : loud murmur unchanged [de-identified] : b/l LE edema; no cords

## 2018-07-26 NOTE — ASSESSMENT
[FreeTextEntry1] : Chronic AC \par On daily Coumadin for AF\par INR in good range\par Continue same dosing\par Recheck INR in 4 weeks\par \par ITP\par Platelet count has been stable\par Will check CBC today\par Hematology f/u as needed\par \par Depression\par Mood worse - less engaged; paranoid\par Psych f/u with Dr. Myles\par On Paxil as per Dr. Myles \par \par Memory loss\par Dementia progressive with mood swings, paranoia\par Neuro f/u advised\par No driving\par \par RTC 3-4 months and as needed\par To call for any medical issues\par Flu vaccine in fall\par Full labs sent today\par Continue meds, diet, exercise as outlined\par F/U with Cardiology and all consulting MD's\par He will not allow home PT

## 2018-07-27 LAB
25(OH)D3 SERPL-MCNC: 38.9 NG/ML
ALBUMIN SERPL ELPH-MCNC: 4.3 G/DL
ALP BLD-CCNC: 79 U/L
ALT SERPL-CCNC: 21 U/L
ANION GAP SERPL CALC-SCNC: 19 MMOL/L
AST SERPL-CCNC: 32 U/L
BASOPHILS # BLD AUTO: 0.05 K/UL
BASOPHILS NFR BLD AUTO: 0.9 %
BILIRUB SERPL-MCNC: 0.8 MG/DL
BUN SERPL-MCNC: 18 MG/DL
CALCIUM SERPL-MCNC: 9 MG/DL
CHLORIDE SERPL-SCNC: 99 MMOL/L
CHOLEST SERPL-MCNC: 94 MG/DL
CHOLEST/HDLC SERPL: 2 RATIO
CO2 SERPL-SCNC: 21 MMOL/L
CREAT SERPL-MCNC: 0.93 MG/DL
EOSINOPHIL # BLD AUTO: 0.08 K/UL
EOSINOPHIL NFR BLD AUTO: 1.5 %
FOLATE SERPL-MCNC: 14.6 NG/ML
GLUCOSE SERPL-MCNC: 78 MG/DL
HBA1C MFR BLD HPLC: 5.6 %
HCT VFR BLD CALC: 34.3 %
HDLC SERPL-MCNC: 47 MG/DL
HGB BLD-MCNC: 11.4 G/DL
IMM GRANULOCYTES NFR BLD AUTO: 0.2 %
LDLC SERPL CALC-MCNC: 38 MG/DL
LYMPHOCYTES # BLD AUTO: 0.96 K/UL
LYMPHOCYTES NFR BLD AUTO: 17.7 %
MAN DIFF?: NORMAL
MCHC RBC-ENTMCNC: 31.7 PG
MCHC RBC-ENTMCNC: 33.2 GM/DL
MCV RBC AUTO: 95.3 FL
MONOCYTES # BLD AUTO: 0.8 K/UL
MONOCYTES NFR BLD AUTO: 14.8 %
NEUTROPHILS # BLD AUTO: 3.51 K/UL
NEUTROPHILS NFR BLD AUTO: 64.9 %
PLATELET # BLD AUTO: 48 K/UL
POTASSIUM SERPL-SCNC: 4.5 MMOL/L
PROT SERPL-MCNC: 6.9 G/DL
RBC # BLD: 3.6 M/UL
RBC # FLD: 16.9 %
SODIUM SERPL-SCNC: 139 MMOL/L
TRIGL SERPL-MCNC: 44 MG/DL
TSH SERPL-ACNC: 2.99 UIU/ML
URATE SERPL-MCNC: 7 MG/DL
VIT B12 SERPL-MCNC: 490 PG/ML
WBC # FLD AUTO: 5.41 K/UL

## 2018-07-31 ENCOUNTER — MEDICATION RENEWAL (OUTPATIENT)
Age: 83
End: 2018-07-31

## 2018-07-31 ENCOUNTER — RX RENEWAL (OUTPATIENT)
Age: 83
End: 2018-07-31

## 2018-07-31 RX ORDER — ALPRAZOLAM 0.25 MG/1
0.25 TABLET ORAL DAILY
Qty: 30 | Refills: 0 | Status: ACTIVE | COMMUNITY
Start: 2018-02-16 | End: 1900-01-01

## 2018-08-03 ENCOUNTER — INPATIENT (INPATIENT)
Facility: HOSPITAL | Age: 83
LOS: 1 days | Discharge: HOME | DRG: 158 | End: 2018-08-05
Attending: INTERNAL MEDICINE | Admitting: INTERNAL MEDICINE
Payer: MEDICARE

## 2018-08-03 VITALS
SYSTOLIC BLOOD PRESSURE: 192 MMHG | TEMPERATURE: 98 F | WEIGHT: 190.04 LBS | HEART RATE: 66 BPM | DIASTOLIC BLOOD PRESSURE: 88 MMHG | HEIGHT: 69 IN | OXYGEN SATURATION: 97 % | RESPIRATION RATE: 18 BRPM

## 2018-08-03 DIAGNOSIS — Z95.1 PRESENCE OF AORTOCORONARY BYPASS GRAFT: Chronic | ICD-10-CM

## 2018-08-03 LAB
ACANTHOCYTES BLD QL SMEAR: SLIGHT — SIGNIFICANT CHANGE UP
ALBUMIN SERPL ELPH-MCNC: 4 G/DL — SIGNIFICANT CHANGE UP (ref 3.3–5)
ALP SERPL-CCNC: 87 U/L — SIGNIFICANT CHANGE UP (ref 40–120)
ALT FLD-CCNC: 19 U/L — SIGNIFICANT CHANGE UP (ref 10–45)
ANION GAP SERPL CALC-SCNC: 12 MMOL/L — SIGNIFICANT CHANGE UP (ref 5–17)
ANISOCYTOSIS BLD QL: SLIGHT — SIGNIFICANT CHANGE UP
APTT BLD: 43.6 SEC — HIGH (ref 27.5–37.4)
AST SERPL-CCNC: 28 U/L — SIGNIFICANT CHANGE UP (ref 10–40)
BASOPHILS # BLD AUTO: 0 K/UL — SIGNIFICANT CHANGE UP (ref 0–0.2)
BASOPHILS NFR BLD AUTO: 0.3 % — SIGNIFICANT CHANGE UP (ref 0–2)
BILIRUB SERPL-MCNC: 0.6 MG/DL — SIGNIFICANT CHANGE UP (ref 0.2–1.2)
BUN SERPL-MCNC: 22 MG/DL — SIGNIFICANT CHANGE UP (ref 7–23)
CALCIUM SERPL-MCNC: 8.9 MG/DL — SIGNIFICANT CHANGE UP (ref 8.4–10.5)
CHLORIDE SERPL-SCNC: 102 MMOL/L — SIGNIFICANT CHANGE UP (ref 96–108)
CO2 SERPL-SCNC: 23 MMOL/L — SIGNIFICANT CHANGE UP (ref 22–31)
CREAT SERPL-MCNC: 1.1 MG/DL — SIGNIFICANT CHANGE UP (ref 0.5–1.3)
ELLIPTOCYTES BLD QL SMEAR: SLIGHT — SIGNIFICANT CHANGE UP
EOSINOPHIL # BLD AUTO: 0.2 K/UL — SIGNIFICANT CHANGE UP (ref 0–0.5)
EOSINOPHIL NFR BLD AUTO: 2.9 % — SIGNIFICANT CHANGE UP (ref 0–6)
GLUCOSE SERPL-MCNC: 134 MG/DL — HIGH (ref 70–99)
HCT VFR BLD CALC: 32.9 % — LOW (ref 39–50)
HGB BLD-MCNC: 11.2 G/DL — LOW (ref 13–17)
INR BLD: 3.36 RATIO — HIGH (ref 0.88–1.16)
LYMPHOCYTES # BLD AUTO: 1.3 K/UL — SIGNIFICANT CHANGE UP (ref 1–3.3)
LYMPHOCYTES # BLD AUTO: 20.2 % — SIGNIFICANT CHANGE UP (ref 13–44)
MCHC RBC-ENTMCNC: 32.8 PG — SIGNIFICANT CHANGE UP (ref 27–34)
MCHC RBC-ENTMCNC: 34.1 GM/DL — SIGNIFICANT CHANGE UP (ref 32–36)
MCV RBC AUTO: 96.2 FL — SIGNIFICANT CHANGE UP (ref 80–100)
MONOCYTES # BLD AUTO: 0.9 K/UL — SIGNIFICANT CHANGE UP (ref 0–0.9)
MONOCYTES NFR BLD AUTO: 13.3 % — SIGNIFICANT CHANGE UP (ref 2–14)
NEUTROPHILS # BLD AUTO: 4.2 K/UL — SIGNIFICANT CHANGE UP (ref 1.8–7.4)
NEUTROPHILS NFR BLD AUTO: 63.4 % — SIGNIFICANT CHANGE UP (ref 43–77)
PLAT MORPH BLD: NORMAL — SIGNIFICANT CHANGE UP
PLATELET # BLD AUTO: 42 K/UL — LOW (ref 150–400)
POIKILOCYTOSIS BLD QL AUTO: SLIGHT — SIGNIFICANT CHANGE UP
POLYCHROMASIA BLD QL SMEAR: SLIGHT — SIGNIFICANT CHANGE UP
POTASSIUM SERPL-MCNC: 3.8 MMOL/L — SIGNIFICANT CHANGE UP (ref 3.5–5.3)
POTASSIUM SERPL-SCNC: 3.8 MMOL/L — SIGNIFICANT CHANGE UP (ref 3.5–5.3)
PROT SERPL-MCNC: 6.7 G/DL — SIGNIFICANT CHANGE UP (ref 6–8.3)
PROTHROM AB SERPL-ACNC: 37.2 SEC — HIGH (ref 9.8–12.7)
RBC # BLD: 3.42 M/UL — LOW (ref 4.2–5.8)
RBC # FLD: 14.5 % — SIGNIFICANT CHANGE UP (ref 10.3–14.5)
RBC BLD AUTO: ABNORMAL
SCHISTOCYTES BLD QL AUTO: SLIGHT — SIGNIFICANT CHANGE UP
SODIUM SERPL-SCNC: 137 MMOL/L — SIGNIFICANT CHANGE UP (ref 135–145)
WBC # BLD: 6.3 K/UL — SIGNIFICANT CHANGE UP (ref 3.8–10.5)
WBC # FLD AUTO: 6.3 K/UL — SIGNIFICANT CHANGE UP (ref 3.8–10.5)

## 2018-08-03 PROCEDURE — 99284 EMERGENCY DEPT VISIT MOD MDM: CPT

## 2018-08-03 RX ORDER — TRANEXAMIC ACID 100 MG/ML
1 INJECTION, SOLUTION INTRAVENOUS ONCE
Qty: 0 | Refills: 0 | Status: DISCONTINUED | OUTPATIENT
Start: 2018-08-03 | End: 2018-08-03

## 2018-08-03 NOTE — ED PROVIDER NOTE - ATTENDING CONTRIBUTION TO CARE
87M pmhx afib on coumadin, itp, thoracic aortic aneurysm, htn, ITP, presents with uncontrollable bleeding from lower gums. pt using toothpick at diner, developed bleeding this afternoon, unable to control with ice, pressure. Denies headache, dizziness, lightheadedness, cp, sob, palpitations or any other complaints.    PE: NAD, NCAT, +bleeding from R mandibular incisors, s/p multiple tooth extractions, no clear laceration, Trachea midline, Normal conjunctiva, lungs CTAB, S1/S2 RRR, Normal perfusion, 2+ radial pulses bilat, Abdomen Soft, NTND, No rebound/guarding, No LE edema, No deformity of extremities, No rashes,  No focal motor or sensory deficits.    HYpertensive on arrival. +uncontrollable bleeding from gums. Check CBC eval for anemia, thrombocytopenia, cmp eval for metabolic derangement. Coags. Control of bleeding, and re-assess. - Teodoro Shanks MD

## 2018-08-03 NOTE — ED PROVIDER NOTE - PROGRESS NOTE DETAILS
pt with continued bleeding from gums despite administration of txa infused gauze, application surgicel. found to have thrombocytopenia, moderately elevated INR. most c/w ITP. plan to give 1u platelets, steroids. d/w family and patient. discussed with heme/onc on call, who agrees with plan. does not recommend reversing INR at this point, recommends holding coumadin dose. dental consulted for additional assistance with control of bleeding. - Teodoro Shanks MD pt with continued bleeding from gums despite administration of txa infused gauze, application surgicel. found to have thrombocytopenia, moderately elevated INR. most c/w ITP. plan to give 1u platelets, steroids. d/w family and patient. discussed with heme/onc on call, who agrees with plan, including steroids and platelets. does not recommend reversing INR at this point, recommends holding coumadin dose. dental consulted for additional assistance with control of bleeding. - Teodoro Shanks MD

## 2018-08-03 NOTE — ED ADULT NURSE NOTE - PMH
Aortic stenosis    Atrial fibrillation    Chronic ITP (idiopathic thrombocytopenic purpura)    Coronary artery disease    Hyperlipidemia    Hypertension

## 2018-08-03 NOTE — ED PROVIDER NOTE - MEDICAL DECISION MAKING DETAILS
87M on coumadin c/o bleeding from mouth after using toothpick. Pt arrives to ED with bleed well controlled with gauze. Bleed looks largely clotted off. Well-appearing otherwise, lungs clear. will check coags, labs, try topical TXA and surgicel to control bleed with outpt dental f/u. 87M on coumadin c/o bleeding from mouth after using toothpick. Well-appearing otherwise, lungs clear. WIll attempt to control bleed with surgacel/topical TXA, if not able may have to admit given coumadin + ITP history. Will check coags, labs, +/- dental consult.

## 2018-08-03 NOTE — ED PROVIDER NOTE - OBJECTIVE STATEMENT
87M pmhx afib on coumadin, thoracic aortic aneurysm, htn, p/w bleeding from the mouth status post using a toothpick at the diner tonight. Pt came to hospital bc he couldn't get bleeding to stop. Estimates he's gone through a "pack of tissues" worth. Not endorsing any sob or dizziness. 87M pmhx afib on coumadin, itp, thoracic aortic aneurysm, htn, p/w bleeding from the mouth status post using a toothpick at the diner tonight. Pt came to hospital bc he couldn't get bleeding to stop. Estimates he's gone through a "pack of tissues" worth. Not endorsing any sob or dizziness.

## 2018-08-03 NOTE — ED ADULT NURSE NOTE - NSIMPLEMENTINTERV_GEN_ALL_ED
Implemented All Fall with Harm Risk Interventions:  [place] to call system. Call bell, personal items and telephone within reach. Instruct patient to call for assistance. Room bathroom lighting operational. Non-slip footwear when patient is off stretcher. Physically safe environment: no spills, clutter or unnecessary equipment. Stretcher in lowest position, wheels locked, appropriate side rails in place. Provide visual cue, wrist band, yellow gown, etc. Monitor gait and stability. Monitor for mental status changes and reorient to person, place, and time. Review medications for side effects contributing to fall risk. Reinforce activity limits and safety measures with patient and family. Provide visual clues: red socks.

## 2018-08-03 NOTE — ED SUB INTERN NOTE - OBJECTIVE STATEMENT FT
86 y/o male with PMHx of AFib on coumadin and a remote history of ITP who presents with a CC of lower gum bleeding for the past three hours after trying to pick at his gums after eating dinner. This has never happened in the past. The amount of bleeding is moderate. He denies any other areas of bleeding. He takes coumadin for Afib and his last INR was 2.5. He denies any other symptoms

## 2018-08-03 NOTE — ED SUB INTERN NOTE - MEDICAL DECISION MAKING DETAILS
86 y/o male on coumadin with remote hx of ITP presents with gum bleeding s/p toothpick. Will order CBC, PT/INR, and apply more gauze to mouth. Ordered topical TXA to try and temporize bleeding. Will make sure no exacerbation of ITP.

## 2018-08-03 NOTE — ED ADULT NURSE NOTE - CHPI ED NUR SYMPTOMS NEG
no decreased eating/drinking/no fever/no chills/no vomiting/no dizziness/no pain/no tingling/no nausea/no weakness

## 2018-08-03 NOTE — ED ADULT NURSE REASSESSMENT NOTE - NS ED NURSE REASSESS COMMENT FT1
pt had second set of gauze in mouth, states bleeding is slowing but has not stopped  MD Rao attempting second method

## 2018-08-03 NOTE — ED ADULT NURSE NOTE - OBJECTIVE STATEMENT
Pt is an 87YOM hx of afib (coumadin), thoracic aortic aneurysm, htn, chronic ITP received ambulatory A&Ox3 complaining of bleeding from the mouth since 2pm this afternoon. Pt states that he used a toothpick after eating and came to the ED because "I cant get the bleeding to stop" Pt spitting out blood, no headache, dizziness, weakness, chest pain, SOB, nausea, vomiting, recent fevers, chills, abdominal pain, urinary symptoms, numbness or tingling. Pt present with family at bedside. Given gauze for mouth. MD at bedside, safety maintained will continue to monitor.

## 2018-08-04 DIAGNOSIS — D69.3 IMMUNE THROMBOCYTOPENIC PURPURA: ICD-10-CM

## 2018-08-04 DIAGNOSIS — I25.10 ATHEROSCLEROTIC HEART DISEASE OF NATIVE CORONARY ARTERY WITHOUT ANGINA PECTORIS: ICD-10-CM

## 2018-08-04 DIAGNOSIS — S09.93XA UNSPECIFIED INJURY OF FACE, INITIAL ENCOUNTER: ICD-10-CM

## 2018-08-04 DIAGNOSIS — Z29.9 ENCOUNTER FOR PROPHYLACTIC MEASURES, UNSPECIFIED: ICD-10-CM

## 2018-08-04 DIAGNOSIS — N40.0 BENIGN PROSTATIC HYPERPLASIA WITHOUT LOWER URINARY TRACT SYMPTOMS: ICD-10-CM

## 2018-08-04 DIAGNOSIS — I16.0 HYPERTENSIVE URGENCY: ICD-10-CM

## 2018-08-04 DIAGNOSIS — I48.91 UNSPECIFIED ATRIAL FIBRILLATION: ICD-10-CM

## 2018-08-04 DIAGNOSIS — K13.79 OTHER LESIONS OF ORAL MUCOSA: ICD-10-CM

## 2018-08-04 LAB
ANION GAP SERPL CALC-SCNC: 14 MMOL/L — SIGNIFICANT CHANGE UP (ref 5–17)
BLD GP AB SCN SERPL QL: NEGATIVE — SIGNIFICANT CHANGE UP
BUN SERPL-MCNC: 20 MG/DL — SIGNIFICANT CHANGE UP (ref 7–23)
CALCIUM SERPL-MCNC: 8.8 MG/DL — SIGNIFICANT CHANGE UP (ref 8.4–10.5)
CHLORIDE SERPL-SCNC: 99 MMOL/L — SIGNIFICANT CHANGE UP (ref 96–108)
CO2 SERPL-SCNC: 21 MMOL/L — LOW (ref 22–31)
CREAT SERPL-MCNC: 0.89 MG/DL — SIGNIFICANT CHANGE UP (ref 0.5–1.3)
GLUCOSE SERPL-MCNC: 150 MG/DL — HIGH (ref 70–99)
HCT VFR BLD CALC: 33.8 % — LOW (ref 39–50)
HCT VFR BLD CALC: 34.4 % — LOW (ref 39–50)
HGB BLD-MCNC: 11.3 G/DL — LOW (ref 13–17)
HGB BLD-MCNC: 11.6 G/DL — LOW (ref 13–17)
INR BLD: 2.75 RATIO — HIGH (ref 0.88–1.16)
MAGNESIUM SERPL-MCNC: 2 MG/DL — SIGNIFICANT CHANGE UP (ref 1.6–2.6)
MCHC RBC-ENTMCNC: 31.6 PG — SIGNIFICANT CHANGE UP (ref 27–34)
MCHC RBC-ENTMCNC: 32.9 GM/DL — SIGNIFICANT CHANGE UP (ref 32–36)
MCHC RBC-ENTMCNC: 33 PG — SIGNIFICANT CHANGE UP (ref 27–34)
MCHC RBC-ENTMCNC: 34.3 GM/DL — SIGNIFICANT CHANGE UP (ref 32–36)
MCV RBC AUTO: 96.1 FL — SIGNIFICANT CHANGE UP (ref 80–100)
MCV RBC AUTO: 96.2 FL — SIGNIFICANT CHANGE UP (ref 80–100)
PHOSPHATE SERPL-MCNC: 3.3 MG/DL — SIGNIFICANT CHANGE UP (ref 2.5–4.5)
PLATELET # BLD AUTO: 46 K/UL — LOW (ref 150–400)
PLATELET # BLD AUTO: 47 K/UL — LOW (ref 150–400)
POTASSIUM SERPL-MCNC: 3.5 MMOL/L — SIGNIFICANT CHANGE UP (ref 3.5–5.3)
POTASSIUM SERPL-SCNC: 3.5 MMOL/L — SIGNIFICANT CHANGE UP (ref 3.5–5.3)
PROTHROM AB SERPL-ACNC: 30.6 SEC — HIGH (ref 9.8–12.7)
RBC # BLD: 3.51 M/UL — LOW (ref 4.2–5.8)
RBC # BLD: 3.58 M/UL — LOW (ref 4.2–5.8)
RBC # FLD: 14.6 % — HIGH (ref 10.3–14.5)
RBC # FLD: 14.6 % — HIGH (ref 10.3–14.5)
RH IG SCN BLD-IMP: POSITIVE — SIGNIFICANT CHANGE UP
SODIUM SERPL-SCNC: 134 MMOL/L — LOW (ref 135–145)
WBC # BLD: 3.9 K/UL — SIGNIFICANT CHANGE UP (ref 3.8–10.5)
WBC # BLD: 5.6 K/UL — SIGNIFICANT CHANGE UP (ref 3.8–10.5)
WBC # FLD AUTO: 3.9 K/UL — SIGNIFICANT CHANGE UP (ref 3.8–10.5)
WBC # FLD AUTO: 5.6 K/UL — SIGNIFICANT CHANGE UP (ref 3.8–10.5)

## 2018-08-04 PROCEDURE — 12345: CPT

## 2018-08-04 PROCEDURE — 99232 SBSQ HOSP IP/OBS MODERATE 35: CPT

## 2018-08-04 RX ORDER — ALPRAZOLAM 0.25 MG
0.25 TABLET ORAL AT BEDTIME
Qty: 0 | Refills: 0 | Status: DISCONTINUED | OUTPATIENT
Start: 2018-08-04 | End: 2018-08-05

## 2018-08-04 RX ORDER — TAMSULOSIN HYDROCHLORIDE 0.4 MG/1
0.4 CAPSULE ORAL AT BEDTIME
Qty: 0 | Refills: 0 | Status: DISCONTINUED | OUTPATIENT
Start: 2018-08-04 | End: 2018-08-05

## 2018-08-04 RX ORDER — HYDRALAZINE HCL 50 MG
50 TABLET ORAL EVERY 8 HOURS
Qty: 0 | Refills: 0 | Status: DISCONTINUED | OUTPATIENT
Start: 2018-08-04 | End: 2018-08-05

## 2018-08-04 RX ORDER — EZETIMIBE AND SIMVASTATIN 10; 80 MG/1; MG/1
1 TABLET, FILM COATED ORAL
Qty: 0 | Refills: 0 | COMMUNITY

## 2018-08-04 RX ORDER — FINASTERIDE 5 MG/1
5 TABLET, FILM COATED ORAL DAILY
Qty: 0 | Refills: 0 | Status: DISCONTINUED | OUTPATIENT
Start: 2018-08-04 | End: 2018-08-05

## 2018-08-04 RX ORDER — CETIRIZINE HYDROCHLORIDE 10 MG/1
1 TABLET ORAL
Qty: 0 | Refills: 0 | COMMUNITY

## 2018-08-04 RX ORDER — EZETIMIBE 10 MG/1
10 TABLET ORAL AT BEDTIME
Qty: 0 | Refills: 0 | Status: DISCONTINUED | OUTPATIENT
Start: 2018-08-04 | End: 2018-08-05

## 2018-08-04 RX ORDER — LORATADINE 10 MG/1
10 TABLET ORAL DAILY
Qty: 0 | Refills: 0 | Status: DISCONTINUED | OUTPATIENT
Start: 2018-08-04 | End: 2018-08-05

## 2018-08-04 RX ORDER — ACETAMINOPHEN 500 MG
650 TABLET ORAL
Qty: 0 | Refills: 0 | COMMUNITY

## 2018-08-04 RX ORDER — ALLOPURINOL 300 MG
100 TABLET ORAL DAILY
Qty: 0 | Refills: 0 | Status: DISCONTINUED | OUTPATIENT
Start: 2018-08-04 | End: 2018-08-05

## 2018-08-04 RX ORDER — DEXAMETHASONE 0.5 MG/5ML
10 ELIXIR ORAL ONCE
Qty: 0 | Refills: 0 | Status: COMPLETED | OUTPATIENT
Start: 2018-08-04 | End: 2018-08-04

## 2018-08-04 RX ORDER — LISINOPRIL 2.5 MG/1
40 TABLET ORAL ONCE
Qty: 0 | Refills: 0 | Status: COMPLETED | OUTPATIENT
Start: 2018-08-04 | End: 2018-08-04

## 2018-08-04 RX ORDER — SIMVASTATIN 20 MG/1
40 TABLET, FILM COATED ORAL AT BEDTIME
Qty: 0 | Refills: 0 | Status: DISCONTINUED | OUTPATIENT
Start: 2018-08-04 | End: 2018-08-05

## 2018-08-04 RX ORDER — POTASSIUM CHLORIDE 20 MEQ
1 PACKET (EA) ORAL
Qty: 0 | Refills: 0 | COMMUNITY

## 2018-08-04 RX ORDER — EZETIMIBE 10 MG/1
1 TABLET ORAL
Qty: 0 | Refills: 0 | COMMUNITY

## 2018-08-04 RX ORDER — FUROSEMIDE 40 MG
40 TABLET ORAL
Qty: 0 | Refills: 0 | Status: DISCONTINUED | OUTPATIENT
Start: 2018-08-04 | End: 2018-08-05

## 2018-08-04 RX ORDER — PANTOPRAZOLE SODIUM 20 MG/1
40 TABLET, DELAYED RELEASE ORAL
Qty: 0 | Refills: 0 | Status: DISCONTINUED | OUTPATIENT
Start: 2018-08-04 | End: 2018-08-05

## 2018-08-04 RX ORDER — HYDRALAZINE HCL 50 MG
50 TABLET ORAL ONCE
Qty: 0 | Refills: 0 | Status: COMPLETED | OUTPATIENT
Start: 2018-08-04 | End: 2018-08-04

## 2018-08-04 RX ORDER — DOCUSATE SODIUM 100 MG
100 CAPSULE ORAL
Qty: 0 | Refills: 0 | Status: DISCONTINUED | OUTPATIENT
Start: 2018-08-04 | End: 2018-08-05

## 2018-08-04 RX ORDER — SENNA PLUS 8.6 MG/1
2 TABLET ORAL AT BEDTIME
Qty: 0 | Refills: 0 | Status: DISCONTINUED | OUTPATIENT
Start: 2018-08-04 | End: 2018-08-05

## 2018-08-04 RX ORDER — WARFARIN SODIUM 2.5 MG/1
4 TABLET ORAL ONCE
Qty: 0 | Refills: 0 | Status: COMPLETED | OUTPATIENT
Start: 2018-08-04 | End: 2018-08-04

## 2018-08-04 RX ORDER — FUROSEMIDE 40 MG
20 TABLET ORAL
Qty: 0 | Refills: 0 | Status: DISCONTINUED | OUTPATIENT
Start: 2018-08-04 | End: 2018-08-05

## 2018-08-04 RX ADMIN — Medication 40 MILLIGRAM(S): at 06:02

## 2018-08-04 RX ADMIN — PANTOPRAZOLE SODIUM 40 MILLIGRAM(S): 20 TABLET, DELAYED RELEASE ORAL at 06:01

## 2018-08-04 RX ADMIN — EZETIMIBE 10 MILLIGRAM(S): 10 TABLET ORAL at 22:15

## 2018-08-04 RX ADMIN — SENNA PLUS 2 TABLET(S): 8.6 TABLET ORAL at 22:15

## 2018-08-04 RX ADMIN — LISINOPRIL 40 MILLIGRAM(S): 2.5 TABLET ORAL at 03:08

## 2018-08-04 RX ADMIN — Medication 20 MILLIGRAM(S): at 14:20

## 2018-08-04 RX ADMIN — Medication 50 MILLIGRAM(S): at 14:20

## 2018-08-04 RX ADMIN — FINASTERIDE 5 MILLIGRAM(S): 5 TABLET, FILM COATED ORAL at 11:36

## 2018-08-04 RX ADMIN — Medication 50 MILLIGRAM(S): at 06:02

## 2018-08-04 RX ADMIN — WARFARIN SODIUM 4 MILLIGRAM(S): 2.5 TABLET ORAL at 22:15

## 2018-08-04 RX ADMIN — Medication 50 MILLIGRAM(S): at 22:15

## 2018-08-04 RX ADMIN — Medication 102 MILLIGRAM(S): at 01:10

## 2018-08-04 RX ADMIN — Medication 100 MILLIGRAM(S): at 06:01

## 2018-08-04 RX ADMIN — Medication 50 MILLIGRAM(S): at 03:08

## 2018-08-04 RX ADMIN — Medication 100 MILLIGRAM(S): at 11:36

## 2018-08-04 RX ADMIN — Medication 100 MILLIGRAM(S): at 17:12

## 2018-08-04 RX ADMIN — SIMVASTATIN 40 MILLIGRAM(S): 20 TABLET, FILM COATED ORAL at 22:15

## 2018-08-04 RX ADMIN — TAMSULOSIN HYDROCHLORIDE 0.4 MILLIGRAM(S): 0.4 CAPSULE ORAL at 22:15

## 2018-08-04 RX ADMIN — Medication 30 MILLIGRAM(S): at 14:35

## 2018-08-04 RX ADMIN — LORATADINE 10 MILLIGRAM(S): 10 TABLET ORAL at 11:36

## 2018-08-04 NOTE — H&P ADULT - PROBLEM SELECTOR PLAN 1
-hemostasis achieved with pressure dressing per dental  -1 u plt ordered in setting of ITP and active bleeding  -f/u dental recs  -INR supratherapeutic, holding coumadin for now, f/u repeat INR -hemostasis achieved with pressure dressing per dental  -1 u plt ordered in setting of ITP and active bleeding  -case d/w dental resident overnight, ok for soft diet  -f/u further  dental recs  -INR supratherapeutic, holding coumadin for now, f/u repeat INR

## 2018-08-04 NOTE — H&P ADULT - ATTENDING COMMENTS
Care to be assumed by day hospitalist at 8 am.  This patient was assigned to me by the hospitalist in charge; my involvement in this case has consisted of the initial history, physical, chart review, and management plan.  This patient was previously unknown to me.  Case endorsed to NP ______ at ____. Care to be assumed by day hospitalist at 8 am.  This patient was assigned to me by the hospitalist in charge; my involvement in this case has consisted of the initial history, physical, chart review, and management plan.  This patient was previously unknown to me.  Case endorsed to JACQUE Wooten [id number] at  445 am.

## 2018-08-04 NOTE — CONSULT NOTE ADULT - ATTENDING COMMENTS
Pt currently no longer bleeding from lower gums. He has long history of thrombocytopenia which is followed by his PCP: Dr Annalisa Lopez. He was at baseline when he had gum bleeding after minor trauma. Bleeding has stopped after platelet transfusion yesterday. He remains on warfarin for atrial fibrillation. Continued anticoagulation may have to be reconsidered if he has repeat episodes of bleeding or if his platelets continue to decline. We reviewed with his daughters and they plan to bring him to PCP after discharge.

## 2018-08-04 NOTE — H&P ADULT - PROBLEM SELECTOR PLAN 2
-missed home meds  -given stat dose oral lisinopril 40 and hydralazine 50  -repeat sbp 170  -will check another repeat in 1-2 hours as likely oral meds not fully in effect yet -missed home meds  -given stat dose oral lisinopril 40 and hydralazine 50  -repeat sbp 170  -will check another repeat in 1-2 hours as likely oral meds not fully in effect yet  -pt will also be due his bid lasix dose

## 2018-08-04 NOTE — H&P ADULT - NSHPREVIEWOFSYSTEMS_GEN_ALL_CORE
REVIEW OF SYSTEMS:  CONSTITUTIONAL: No weakness. No fevers. No chills. No weight loss. Good appetite.  EYES: No visual changes. No eye pain.  ENT: No hearing difficulty. No vertigo. No dysphagia. No Sinusitis/rhinorrhea.  NECK: No pain. No stiffness/rigidity.  CARDIAC: No chest pain. No palpitations.  RESPIRATORY: No cough. No SOB. No hemoptysis.  GASTROINTESTINAL: No abdominal pain. No nausea. No vomiting. No hematemesis. No diarrhea. No constipation. No melena. No hematochezia.  GENITOURINARY: No dysuria. No frequency. No hesitancy. No hematuria.  NEUROLOGICAL: No numbness. No focal weakness. No incontinence. No headache.  BACK: No back pain.  EXTREMITIES: No lower extremity edema. Full ROM.  SKIN: No rashes. No itching. No other lesions.  PSYCHIATRIC: No depression. No anxiety. No SI/HI.  ALLERGIC: No lip swelling. No hives.  All other review of systems is negative unless indicated above.

## 2018-08-04 NOTE — CONSULT NOTE ADULT - ASSESSMENT
87M with PMHx AFib on coumadin, CAD (s/p CABG '95), HTN, HLD, Gout, severe AS, ITP, chronic aortoiliac dissection who presented to the ED with acute bleeding from mouth after using a toothpick, found to have thrombocytopenia and supratherapeutic INR    # Mouth bleeding  Bleeding provoked by toothpick in the setting of supratherapeutic INR. Seen by dental in the ED, currently bleeding resolved.  - may use aminocaproic acid or tranexamic acid rinse if he has recurrent mucosal bleeding - patient explained that this would require him to keep the rinse in mouth for 1minute and then spit it, no swallowing   - dental following    # Thrombocytopenia   Per family and outpatient records, he has hx of chronic ITP. Patient denies ever requiring IVIG or platelets. Acute on chronic thrombocytopenia likely in the setting of acute bleeding.   - s/p 1unit platelets overnight  - platelets stable at 46k today, would expect them to improve now bleeding has resolved  - smear reviewed: no giant platelets, few tear drops  - can consider outpatient workup such as bone marrow biopsy to r/o myelosuppresion    # Supratherapeutic INR  INR of 3.6 on admission. Yesterday's dose of coumadin held, currently INR at goal  - dose coumadin tonight      Case seen and discussed with Dr. Mike. 87M with PMHx AFib on coumadin, CAD (s/p CABG '95), HTN, HLD, Gout, severe AS, ITP, chronic aortoiliac dissection who presented to the ED with acute bleeding from mouth after using a toothpick, found to have thrombocytopenia and supratherapeutic INR    # Mouth bleeding  Bleeding provoked by toothpick in the setting of supratherapeutic INR. Seen by dental in the ED, currently bleeding resolved.  - may use aminocaproic acid or tranexamic acid rinse if he has recurrent mucosal bleeding - patient explained that this would require him to keep the rinse in mouth for 1minute and then spit it, no swallowing   - dental following    # Thrombocytopenia   Per family and outpatient records, he has hx of chronic ITP. Patient denies ever requiring IVIG or platelets. Acute on chronic thrombocytopenia likely in the setting of acute bleeding.   - s/p 1unit platelets overnight  - platelets stable at 46k today, would expect them to improve now bleeding has resolved  - check b12, folate   - smear reviewed: no giant platelets, few tear drops  - can consider outpatient workup such as bone marrow biopsy to r/o myelosuppresion    # Supratherapeutic INR  INR of 3.6 on admission. Yesterday's dose of coumadin held, currently INR at goal  - dose coumadin tonight      Case seen and discussed with Dr. Mike.

## 2018-08-04 NOTE — CONSULT NOTE ADULT - SUBJECTIVE AND OBJECTIVE BOX
HPI:  patient is an 87  year old gentleman with PMHx AFib on coumadin, CAD (s/p CABG '95), HTN, HLD, Gout, severe AS, ITP, chronic aortoiliac dissection who presented to the ED with acute bleeding from mouth after using a toothpick. Patient's daughter states that he had persistent bleeding for approximately 12hrs for which they decided to bring him to the hospital. In the ED, patient was noted to have platelets of 42K and a supratherapeutic INR of 3.6. Given the acute bleeding episode he was given 1 unit of platelets, TXA and decadron 10mg x 1. Yesterday     Today, patient reports that the bleeding has since resolved. Plts are stable at 46k and INR is now therapeutic at 2.6.    Allergies    No Known Allergies    Intolerances        MEDICATIONS  (STANDING):  allopurinol 100 milliGRAM(s) Oral daily  docusate sodium 100 milliGRAM(s) Oral two times a day  ezetimibe 10 milliGRAM(s) Oral at bedtime  finasteride 5 milliGRAM(s) Oral daily  furosemide    Tablet 20 milliGRAM(s) Oral <User Schedule>  furosemide    Tablet 40 milliGRAM(s) Oral <User Schedule>  hydrALAZINE 50 milliGRAM(s) Oral every 8 hours  loratadine 10 milliGRAM(s) Oral daily  pantoprazole    Tablet 40 milliGRAM(s) Oral before breakfast  PARoxetine 30 milliGRAM(s) Oral daily  simvastatin 40 milliGRAM(s) Oral at bedtime  tamsulosin 0.4 milliGRAM(s) Oral at bedtime  warfarin 4 milliGRAM(s) Oral once    MEDICATIONS  (PRN):  ALPRAZolam 0.25 milliGRAM(s) Oral at bedtime PRN anxiety      PAST MEDICAL & SURGICAL HISTORY:  Aortic stenosis  Chronic ITP (idiopathic thrombocytopenic purpura)  Coronary artery disease  Atrial fibrillation  Hyperlipidemia  Hypertension  S/P CABG (coronary artery bypass graft)      FAMILY HISTORY:  No pertinent family history in first degree relatives      SOCIAL HISTORY: No EtOH, no tobacco    REVIEW OF SYSTEMS:    CONSTITUTIONAL: No weakness, fevers or chills  EYES/ENT: No visual changes;  No vertigo or throat pain   NECK: No pain or stiffness  RESPIRATORY: No cough, wheezing, hemoptysis; No shortness of breath  CARDIOVASCULAR: No chest pain or palpitations  GASTROINTESTINAL: No abdominal or epigastric pain. No nausea, vomiting, or hematemesis; No diarrhea or constipation. No melena or hematochezia.  GENITOURINARY: No dysuria, frequency or hematuria  NEUROLOGICAL: No numbness or weakness  SKIN: No itching, burning, rashes, or lesions   All other review of systems is negative unless indicated above.    Height (cm): 175.26 (08-04 @ 03:32)  Weight (kg): 86 (08-04 @ 03:32)  BMI (kg/m2): 28 (08-04 @ 03:32)  BSA (m2): 2.02 (08-04 @ 03:32)    T(F): 98.3 (08-04-18 @ 07:45), Max: 98.3 (08-03-18 @ 20:20)  HR: 76 (08-04-18 @ 07:45)  BP: 143/90 (08-04-18 @ 07:45)  RR: 18 (08-04-18 @ 07:45)  SpO2: 93% (08-04-18 @ 07:45)  Wt(kg): --    GENERAL: NAD, well-developed  HEAD:  Atraumatic, Normocephalic  EYES: EOMI, PERRLA, conjunctiva and sclera clear  NECK: Supple, No JVD  CHEST/LUNG: Clear to auscultation bilaterally; No wheeze  HEART: Regular rate and rhythm; No murmurs, rubs, or gallops  ABDOMEN: Soft, Nontender, Nondistended; Bowel sounds present  EXTREMITIES:  2+ Peripheral Pulses, No clubbing, cyanosis, or edema  NEUROLOGY: non-focal  SKIN: No rashes or lesions                          11.6   3.9   )-----------( 46       ( 04 Aug 2018 11:04 )             33.8       08-04    134<L>  |  99  |  20  ----------------------------<  150<H>  3.5   |  21<L>  |  0.89    Ca    8.8      04 Aug 2018 11:01  Phos  3.3     08-04  Mg     2.0     08-04    TPro  6.7  /  Alb  4.0  /  TBili  0.6  /  DBili  x   /  AST  28  /  ALT  19  /  AlkPhos  87  08-03      Magnesium, Serum: 2.0 mg/dL (08-04 @ 11:01)  Phosphorus Level, Serum: 3.3 mg/dL (08-04 @ 11:01) HPI:  patient is an 87  year old gentleman with PMHx AFib on coumadin, CAD (s/p CABG '95), HTN, HLD, Gout, severe AS, ITP, chronic aortoiliac dissection who presented to the ED with acute bleeding from mouth after using a toothpick. Patient's daughter states that he had persistent bleeding for approximately 12hrs for which they decided to bring him to the hospital. In the ED, patient was noted to have platelets of 42K and a supratherapeutic INR of 3.6. Given the acute bleeding episode he was given 1 unit of platelets, TXA and decadron 10mg x 1. Yesterday     Today, patient reports that the bleeding has since resolved. Plts are stable at 46k and INR is now therapeutic at 2.6. Patient denies easy bruising, epistaxis, melena, hematemesis, nausea, vomiting, diarrhea.     Per patient and family, he was diagnosed with ITP ~20yrs ago. Patient follows with PCP Dr. Lopez but has never been seen by Hematology for this matter.     Allergies    No Known Allergies    Intolerances        MEDICATIONS  (STANDING):  allopurinol 100 milliGRAM(s) Oral daily  docusate sodium 100 milliGRAM(s) Oral two times a day  ezetimibe 10 milliGRAM(s) Oral at bedtime  finasteride 5 milliGRAM(s) Oral daily  furosemide    Tablet 20 milliGRAM(s) Oral <User Schedule>  furosemide    Tablet 40 milliGRAM(s) Oral <User Schedule>  hydrALAZINE 50 milliGRAM(s) Oral every 8 hours  loratadine 10 milliGRAM(s) Oral daily  pantoprazole    Tablet 40 milliGRAM(s) Oral before breakfast  PARoxetine 30 milliGRAM(s) Oral daily  simvastatin 40 milliGRAM(s) Oral at bedtime  tamsulosin 0.4 milliGRAM(s) Oral at bedtime  warfarin 4 milliGRAM(s) Oral once    MEDICATIONS  (PRN):  ALPRAZolam 0.25 milliGRAM(s) Oral at bedtime PRN anxiety      PAST MEDICAL & SURGICAL HISTORY:  Aortic stenosis  Chronic ITP (idiopathic thrombocytopenic purpura)  Coronary artery disease  Atrial fibrillation  Hyperlipidemia  Hypertension  S/P CABG (coronary artery bypass graft)      FAMILY HISTORY:  No pertinent family history in first degree relatives      SOCIAL HISTORY: No EtOH, no tobacco    REVIEW OF SYSTEMS:    CONSTITUTIONAL: No weakness, fevers or chills  EYES/ENT: No visual changes;  No vertigo or throat pain   NECK: No pain or stiffness  RESPIRATORY: No cough, wheezing, hemoptysis; No shortness of breath  CARDIOVASCULAR: No chest pain or palpitations  GASTROINTESTINAL: No abdominal or epigastric pain. No nausea, vomiting, or hematemesis; No diarrhea or constipation. No melena or hematochezia.  GENITOURINARY: No dysuria, frequency or hematuria  NEUROLOGICAL: No numbness or weakness  SKIN: No itching, burning, rashes, or lesions   All other review of systems is negative unless indicated above.    Height (cm): 175.26 (08-04 @ 03:32)  Weight (kg): 86 (08-04 @ 03:32)  BMI (kg/m2): 28 (08-04 @ 03:32)  BSA (m2): 2.02 (08-04 @ 03:32)    T(F): 98.3 (08-04-18 @ 07:45), Max: 98.3 (08-03-18 @ 20:20)  HR: 76 (08-04-18 @ 07:45)  BP: 143/90 (08-04-18 @ 07:45)  RR: 18 (08-04-18 @ 07:45)  SpO2: 93% (08-04-18 @ 07:45)  Wt(kg): --    GENERAL: NAD, well-developed  HEAD:  Atraumatic, Normocephalic  EYES: EOMI, conjunctiva and sclera clear  NECK: Supple  CHEST/LUNG: Clear to auscultation bilaterally; No wheeze  HEART: Regular rate and rhythm; No murmurs, rubs, or gallops  ABDOMEN: Soft, Nontender, Nondistended  EXTREMITIES:  no edema  NEUROLOGY: non-focal, aa&o x 3                           11.6   3.9   )-----------( 46       ( 04 Aug 2018 11:04 )             33.8       08-04    134<L>  |  99  |  20  ----------------------------<  150<H>  3.5   |  21<L>  |  0.89    Ca    8.8      04 Aug 2018 11:01  Phos  3.3     08-04  Mg     2.0     08-04    TPro  6.7  /  Alb  4.0  /  TBili  0.6  /  DBili  x   /  AST  28  /  ALT  19  /  AlkPhos  87  08-03      Magnesium, Serum: 2.0 mg/dL (08-04 @ 11:01)  Phosphorus Level, Serum: 3.3 mg/dL (08-04 @ 11:01)

## 2018-08-04 NOTE — H&P ADULT - NSHPLABSRESULTS_GEN_ALL_CORE
Labs: no leukocytosis, chronic stable anemia, chronic thrombocytopenia, INR 3.36, CMP with janna vs ckd and mild hyperglycemia.  No ekg or imaging available to personally review in ER chart

## 2018-08-04 NOTE — H&P ADULT - ASSESSMENT
87 M PMH AFib on coumadin, CAD (s/p CABG '95), HTN, HLD, Gout, severe AS, ITP, chronic aortoiliac dissection, p/w acute bleeding from mouth after using a toothpick

## 2018-08-04 NOTE — H&P ADULT - NSHPSOCIALHISTORY_GEN_ALL_CORE
Social History:    Marital Status:  ( x  )    (   ) Single    (   )    (  )   Occupation: retired [employer]  Lives with: (  ) alone  (x  ) children   (  ) spouse   (  ) parents  (  ) other    Substance Use (street drugs): ( x ) never used  (  ) other:  Tobacco Usage:  (   ) never smoked   ( x  ) former smoker   (   ) current smoker  (     ) pack year  (        ) last cigarette date  Alcohol Usage: denies

## 2018-08-04 NOTE — H&P ADULT - HISTORY OF PRESENT ILLNESS
87 M PMH AFib on coumadin, CAD (s/p CABG '95), HTN, HLD, Gout, severe AS, ITP, chronic aortoiliac dissection, p/w acute bleeding from mouth after using a toothpick.    VS: 98.3, 58, 166/87, 18, 96% RA.  Labs: no leukocytosis, chronic stable anemia, chronic thrombocytopenia, INR 3.36, CMP with janna vs ckd and mild hyperglycemia. No imaging in ER. In ER pt given decadron 10 x 1, txa 1g, txa gauze, surgicel, and 1u plt. Seen by dental in ER. 87 M PMH AFib on coumadin, CAD (s/p CABG '95), HTN, HLD, Gout, severe AS, ITP, chronic aortoiliac dissection, p/w acute bleeding from mouth after using a toothpick. Pt returned from diner this afternoon, and used a tootpick afterwards.  His daughter returned home and noticed he was bleeding from his gums, and after multiple attempts at stopping the bleeding with pressure that were unsuccessful, they came to the ER. He denies any preceding bleeding, has some chronic unchanged bruising on arms, denies hematemesis/melena/hematochezia. Denies headaches or visual changes. Pt last took coumadin 5 mg yesterday. Denies coughing/choking on blood or food.     VS: 98.3, 58, 166/87, 18, 96% RA.  Labs: no leukocytosis, chronic stable anemia, chronic thrombocytopenia, INR 3.36, CMP with janna vs ckd and mild hyperglycemia. No imaging in ER. In ER pt given decadron 10 x 1, txa 1g, txa gauze, surgicel, and 1u plt. Seen by dental in ER.

## 2018-08-04 NOTE — H&P ADULT - PROBLEM SELECTOR PLAN 7
no pharm ppx given bleed/thrombocytopenia  -start scd no pharm ppx given bleed/thrombocytopenia  -start scd  -pt on prn xanax istop Reference #: [id number] c/w xanax 0.25 qd prn

## 2018-08-04 NOTE — H&P ADULT - NSHPPHYSICALEXAM_GEN_ALL_CORE
PHYSICAL EXAM:  GENERAL: NAD, well-groomed, well-developed  HEAD:  Atraumatic, Normocephalic  EYES: EOMI, PERRLA, conjunctiva and sclera clear  ENMT: No tonsillar erythema, exudates, or enlargement; Moist mucous membranes, poor dentition, +dried blood in oral cavity and on tongue, no active bleeding  NECK: Supple, No JVD, Normal thyroid  CHEST/LUNG: Clear to percussion bilaterally with dec bs at bases; No rales, rhonchi, wheezing, or rubs  HEART: Regular rate and rhythm; +loud 4/6 systolic murmur rad to carotids, no  rubs, or gallops  ABDOMEN: Soft, Nontender, Nondistended; Bowel sounds present no rebound/guarding  EXTREMITIES:  2+ Peripheral Pulses, No clubbing, cyanosis  LYMPH: No lymphadenopathy noted  SKIN: No rashes or lesions  NERVOUS SYSTEM:  Alert & Oriented X2-3, Good concentration; Motor Strength 5/5 B/L upper and lower extremities

## 2018-08-04 NOTE — ED ADULT NURSE REASSESSMENT NOTE - NS ED NURSE REASSESS COMMENT FT1
MD Leo Brand at pts bedside  aware of pts elevated BP  states to give pts dose of home medications which pt did not take  medication given  states okay for pt to go to floor, dispatched for 8MON  Nida RN called on 8MON to relay additional information after report had already been called

## 2018-08-04 NOTE — CHART NOTE - NSCHARTNOTEFT_GEN_A_CORE
Pt seen and examined.     87 M AFib on coumadin, ITP aw acute bleeding from mouth after using a toothpick. INR supratherapeutic on admission.     Bleeding now resolved. Await repeat INR. Monitor plt counts- heme to follow. PT eval pending.     Plan discussed w both daughters at bedside.

## 2018-08-05 ENCOUNTER — TRANSCRIPTION ENCOUNTER (OUTPATIENT)
Age: 83
End: 2018-08-05

## 2018-08-05 VITALS
DIASTOLIC BLOOD PRESSURE: 72 MMHG | RESPIRATION RATE: 18 BRPM | OXYGEN SATURATION: 100 % | TEMPERATURE: 97 F | HEART RATE: 81 BPM | SYSTOLIC BLOOD PRESSURE: 131 MMHG

## 2018-08-05 LAB
ANION GAP SERPL CALC-SCNC: 11 MMOL/L — SIGNIFICANT CHANGE UP (ref 5–17)
BUN SERPL-MCNC: 28 MG/DL — HIGH (ref 7–23)
CALCIUM SERPL-MCNC: 8.7 MG/DL — SIGNIFICANT CHANGE UP (ref 8.4–10.5)
CHLORIDE SERPL-SCNC: 101 MMOL/L — SIGNIFICANT CHANGE UP (ref 96–108)
CO2 SERPL-SCNC: 23 MMOL/L — SIGNIFICANT CHANGE UP (ref 22–31)
CREAT SERPL-MCNC: 1.02 MG/DL — SIGNIFICANT CHANGE UP (ref 0.5–1.3)
GLUCOSE SERPL-MCNC: 104 MG/DL — HIGH (ref 70–99)
HCT VFR BLD CALC: 30.8 % — LOW (ref 39–50)
HGB BLD-MCNC: 10.7 G/DL — LOW (ref 13–17)
INR BLD: 2.38 RATIO — HIGH (ref 0.88–1.16)
MCHC RBC-ENTMCNC: 32.3 PG — SIGNIFICANT CHANGE UP (ref 27–34)
MCHC RBC-ENTMCNC: 34.7 GM/DL — SIGNIFICANT CHANGE UP (ref 32–36)
MCV RBC AUTO: 93.1 FL — SIGNIFICANT CHANGE UP (ref 80–100)
PLATELET # BLD AUTO: 55 K/UL — LOW (ref 150–400)
POTASSIUM SERPL-MCNC: 3.7 MMOL/L — SIGNIFICANT CHANGE UP (ref 3.5–5.3)
POTASSIUM SERPL-SCNC: 3.7 MMOL/L — SIGNIFICANT CHANGE UP (ref 3.5–5.3)
PROTHROM AB SERPL-ACNC: 27.4 SEC — HIGH (ref 10–13.1)
RBC # BLD: 3.31 M/UL — LOW (ref 4.2–5.8)
RBC # FLD: 16.5 % — HIGH (ref 10.3–14.5)
SODIUM SERPL-SCNC: 135 MMOL/L — SIGNIFICANT CHANGE UP (ref 135–145)
WBC # BLD: 6.95 K/UL — SIGNIFICANT CHANGE UP (ref 3.8–10.5)
WBC # FLD AUTO: 6.95 K/UL — SIGNIFICANT CHANGE UP (ref 3.8–10.5)

## 2018-08-05 PROCEDURE — 86901 BLOOD TYPING SEROLOGIC RH(D): CPT

## 2018-08-05 PROCEDURE — 85610 PROTHROMBIN TIME: CPT

## 2018-08-05 PROCEDURE — P9037: CPT

## 2018-08-05 PROCEDURE — 96374 THER/PROPH/DIAG INJ IV PUSH: CPT

## 2018-08-05 PROCEDURE — 86900 BLOOD TYPING SEROLOGIC ABO: CPT

## 2018-08-05 PROCEDURE — 36430 TRANSFUSION BLD/BLD COMPNT: CPT

## 2018-08-05 PROCEDURE — 99285 EMERGENCY DEPT VISIT HI MDM: CPT | Mod: 25

## 2018-08-05 PROCEDURE — 83735 ASSAY OF MAGNESIUM: CPT

## 2018-08-05 PROCEDURE — 84100 ASSAY OF PHOSPHORUS: CPT

## 2018-08-05 PROCEDURE — 86850 RBC ANTIBODY SCREEN: CPT

## 2018-08-05 PROCEDURE — 85730 THROMBOPLASTIN TIME PARTIAL: CPT

## 2018-08-05 PROCEDURE — 80053 COMPREHEN METABOLIC PANEL: CPT

## 2018-08-05 PROCEDURE — 80048 BASIC METABOLIC PNL TOTAL CA: CPT

## 2018-08-05 PROCEDURE — 85027 COMPLETE CBC AUTOMATED: CPT

## 2018-08-05 PROCEDURE — 99239 HOSP IP/OBS DSCHRG MGMT >30: CPT

## 2018-08-05 PROCEDURE — 97162 PT EVAL MOD COMPLEX 30 MIN: CPT

## 2018-08-05 RX ORDER — DOCUSATE SODIUM 100 MG
1 CAPSULE ORAL
Qty: 0 | Refills: 0 | COMMUNITY
Start: 2018-08-05

## 2018-08-05 RX ORDER — FUROSEMIDE 40 MG
1 TABLET ORAL
Qty: 0 | Refills: 0 | COMMUNITY
Start: 2018-08-05

## 2018-08-05 RX ORDER — FINASTERIDE 5 MG/1
1 TABLET, FILM COATED ORAL
Qty: 0 | Refills: 0 | COMMUNITY

## 2018-08-05 RX ORDER — WARFARIN SODIUM 2.5 MG/1
1 TABLET ORAL
Qty: 5 | Refills: 0 | OUTPATIENT
Start: 2018-08-05 | End: 2018-08-09

## 2018-08-05 RX ORDER — WARFARIN SODIUM 2.5 MG/1
1 TABLET ORAL
Qty: 0 | Refills: 0 | COMMUNITY
Start: 2018-08-05

## 2018-08-05 RX ORDER — ALPRAZOLAM 0.25 MG
1 TABLET ORAL
Qty: 0 | Refills: 0 | COMMUNITY
Start: 2018-08-05

## 2018-08-05 RX ORDER — WARFARIN SODIUM 2.5 MG/1
4 TABLET ORAL ONCE
Qty: 0 | Refills: 0 | Status: DISCONTINUED | OUTPATIENT
Start: 2018-08-05 | End: 2018-08-05

## 2018-08-05 RX ORDER — SENNA PLUS 8.6 MG/1
2 TABLET ORAL
Qty: 0 | Refills: 0 | COMMUNITY
Start: 2018-08-05

## 2018-08-05 RX ORDER — FINASTERIDE 5 MG/1
1 TABLET, FILM COATED ORAL
Qty: 0 | Refills: 0 | COMMUNITY
Start: 2018-08-05

## 2018-08-05 RX ORDER — TAMSULOSIN HYDROCHLORIDE 0.4 MG/1
1 CAPSULE ORAL
Qty: 0 | Refills: 0 | COMMUNITY
Start: 2018-08-05

## 2018-08-05 RX ORDER — ALPRAZOLAM 0.25 MG
1 TABLET ORAL
Qty: 0 | Refills: 0 | COMMUNITY

## 2018-08-05 RX ORDER — ALLOPURINOL 300 MG
1 TABLET ORAL
Qty: 0 | Refills: 0 | COMMUNITY

## 2018-08-05 RX ORDER — ALLOPURINOL 300 MG
1 TABLET ORAL
Qty: 0 | Refills: 0 | COMMUNITY
Start: 2018-08-05

## 2018-08-05 RX ORDER — HYDRALAZINE HCL 50 MG
1 TABLET ORAL
Qty: 0 | Refills: 0 | COMMUNITY
Start: 2018-08-05

## 2018-08-05 RX ORDER — PANTOPRAZOLE SODIUM 20 MG/1
1 TABLET, DELAYED RELEASE ORAL
Qty: 0 | Refills: 0 | COMMUNITY
Start: 2018-08-05

## 2018-08-05 RX ORDER — WARFARIN SODIUM 2.5 MG/1
1 TABLET ORAL
Qty: 0 | Refills: 0 | COMMUNITY

## 2018-08-05 RX ORDER — PANTOPRAZOLE SODIUM 20 MG/1
1 TABLET, DELAYED RELEASE ORAL
Qty: 0 | Refills: 0 | COMMUNITY

## 2018-08-05 RX ADMIN — Medication 50 MILLIGRAM(S): at 06:13

## 2018-08-05 RX ADMIN — Medication 100 MILLIGRAM(S): at 06:13

## 2018-08-05 RX ADMIN — FINASTERIDE 5 MILLIGRAM(S): 5 TABLET, FILM COATED ORAL at 12:33

## 2018-08-05 RX ADMIN — Medication 20 MILLIGRAM(S): at 14:33

## 2018-08-05 RX ADMIN — Medication 40 MILLIGRAM(S): at 06:13

## 2018-08-05 RX ADMIN — PANTOPRAZOLE SODIUM 40 MILLIGRAM(S): 20 TABLET, DELAYED RELEASE ORAL at 06:13

## 2018-08-05 RX ADMIN — LORATADINE 10 MILLIGRAM(S): 10 TABLET ORAL at 12:33

## 2018-08-05 RX ADMIN — Medication 50 MILLIGRAM(S): at 14:32

## 2018-08-05 RX ADMIN — Medication 30 MILLIGRAM(S): at 12:32

## 2018-08-05 RX ADMIN — Medication 100 MILLIGRAM(S): at 12:32

## 2018-08-05 NOTE — PROGRESS NOTE ADULT - SUBJECTIVE AND OBJECTIVE BOX
Patient is a 87y old male who presents with his daughter with a chief complaint of "bleeding since 3-4 pm today after using a toothpick."    HPI: 86 YO M presents with daughter, Torrie, with a CC of bleeding following use of a toothpick.  HPI mainly provided by daughter since patient had gauze in mouth and was only able to nod head yes or no.  They went to the diner for lunch and he used a toothpick to pick at his teeth when he was home.  His daughter went out for an hour and when she returned, he was bleeding from his mouth and was unable to stop the bleeding since 3-4 pm.  Patient denies any bleeding episodes in the past with the exception of a nosebleed which would not stop last year for which he had to come to the ED.  While in the ED, medical team ran labs showing INR 3.36, Platelets 42, Hgb 11.2, PTT 43.6 and PT 37.2 and applied TXA gauze and surgicel to area but were unable to identify source of bleeding (laceration vs. sulcular) or obtain hemostasis.  Denies fever, chills, nausea, vomiting, difficulty breathing, dizziness, headaches, LOC.       PAST MEDICAL & SURGICAL HISTORY:  Aortic stenosis  Chronic ITP (idiopathic thrombocytopenic purpura)  Coronary artery disease  Atrial fibrillation  Hyperlipidemia  Hypertension  S/P CABG (coronary artery bypass graft)    MEDICATIONS  (STANDING):  hydralazine  furosemide  bacitracin  paroxtene  tamsulosin  lisinopril  prednisone  docusate sodium  senna  pravastatin  finasteride  zetia  warfarin    MEDICATIONS  (PRN):      ALLERGIES: NDKA    FAMILY HISTORY: No pertinent family history in first degree relatives    SOCIAL HISTORY: daughter states he and his wife have many medical appointments and it is difficult to schedule dental care    LAST DENTAL VISIT:  1 year ago or more    Vital Signs Last 24 Hrs  T(C): 36.8 (03 Aug 2018 20:20), Max: 36.8 (03 Aug 2018 20:20)  T(F): 98.3 (03 Aug 2018 20:20), Max: 98.3 (03 Aug 2018 20:20)  HR: 58 (03 Aug 2018 22:00) (58 - 66)  BP: 166/87 (03 Aug 2018 22:00) (166/87 - 192/88)  BP(mean): --  RR: 18 (03 Aug 2018 22:00) (18 - 18)  SpO2: 96% (03 Aug 2018 22:00) (96% - 97%)    EOE:  TMJ (-) clicks                  (-) pops                  (-) crepitus             Mandible FROM             Facial bones and MOM grossly intact             (-) trismus             (-) LAD             (-) swelling             (-) asymmetry             (-) palpation             (-) SOB             (-) dysphagia             (-) LOC    IOE:  permanent dentition: multiple carious teeth, multiple missing teeth           hard/soft palate:  (-)palatal torus           tongue/FOM WNL (-) mandibular samantha           labial/buccal mucosa WNL           (-)percussion           (-)palpation           (-)swelling     No oral lesions on FOM, lateral borders of tongue, hard palate, soft palate, uvula, buccal mucosa.  2 mm in widest dimension laceration on soft tissue 2 mm distal and 4 mm inferior to lingual to #28, hemostatic at time of exam.  No bleeding from sulcus.  Surgicel and/or clotted blood noted on tongue and gingival margins.     LABS:                        11.2   6.3   )-----------( 42       ( 03 Aug 2018 21:37 )             32.9     08-03    137  |  102  |  22  ----------------------------<  134<H>  3.8   |  23  |  1.10    Ca    8.9      03 Aug 2018 21:37    TPro  6.7  /  Alb  4.0  /  TBili  0.6  /  DBili  x   /  AST  28  /  ALT  19  /  AlkPhos  87  08-03    WBC Count: 6.3 K/uL [3.8 - 10.5] (08-03 @ 21:37)  Platelet Count - Automated: 42 K/uL <L> [150 - 400] (08-03 @ 21:37)  INR: 3.36 ratio <H> [0.88 - 1.16] (08-03 @ 21:37)    DENTAL RADIOGRAPHS: recommended obtaining PA and/or panoramic radiograph to rule out odontogenic infection/parulis, but patient and daughter requested not to exacerbate area since hemostatic by time of dental consult    ASSESSMENT: 86 YO M previously noted to have bleeding from the mouth for >8h hemostatic at time of dental consult.     PROCEDURE:  Verbal consent obtained.  EOE, IOE.  No rads.  Re-applied gauze to area.  POIG.     RECOMMENDATIONS:  1) Gauze pressure and/or teabag applied to area.  2) Dental follow up with outpatient dentist for comprehensive dental care.   3) Avoid disturbing area to prevent further bleeding.   4) If any acute oral changes occur, including oral swelling, oral bleeding, difficulty swallowing/breathing, or fever occur, return to ED.     Amparo Krishnamurthy DDS #[id number]  Marianne Kerr DDS
Patient is a 87y old male who presents with his daughter with a chief complaint of "bleeding since 3-4 pm yesterday after using a toothpick."    HPI: 88 YO M presents with daughter, Torrie, with a CC of bleeding following use of a toothpick.  HPI mainly provided by daughter since patient had gauze in mouth and was only able to nod head yes or no.  They went to the diner for lunch and he used a toothpick to pick at his teeth when he was home.  His daughter went out for an hour and when she returned, he was bleeding from his mouth and was unable to stop the bleeding since 3-4 pm.  Patient denies any bleeding episodes in the past with the exception of a nosebleed which would not stop last year for which he had to come to the ED.  While in the ED, medical team ran labs showing INR 3.36, Platelets 42, Hgb 11.2, PTT 43.6 and PT 37.2 and applied TXA gauze and surgicel to area but were unable to identify source of bleeding (laceration vs. sulcular) or obtain hemostasis.  Denies fever, chills, nausea, vomiting, difficulty breathing, dizziness, headaches, LOC.       MEDICATIONS  (STANDING):  allopurinol 100 milliGRAM(s) Oral daily  docusate sodium 100 milliGRAM(s) Oral two times a day  ezetimibe 10 milliGRAM(s) Oral at bedtime  finasteride 5 milliGRAM(s) Oral daily  furosemide    Tablet 20 milliGRAM(s) Oral <User Schedule>  furosemide    Tablet 40 milliGRAM(s) Oral <User Schedule>  hydrALAZINE 50 milliGRAM(s) Oral every 8 hours  loratadine 10 milliGRAM(s) Oral daily  pantoprazole    Tablet 40 milliGRAM(s) Oral before breakfast  PARoxetine 30 milliGRAM(s) Oral daily  senna 2 Tablet(s) Oral at bedtime  simvastatin 40 milliGRAM(s) Oral at bedtime  tamsulosin 0.4 milliGRAM(s) Oral at bedtime  warfarin 4 milliGRAM(s) Oral once    MEDICATIONS  (PRN):  ALPRAZolam 0.25 milliGRAM(s) Oral at bedtime PRN anxiety      Allergies    No Known Allergies        Vital Signs Last 24 Hrs  T(C): 36.8 (04 Aug 2018 16:18), Max: 36.8 (03 Aug 2018 20:20)  T(F): 98.3 (04 Aug 2018 16:18), Max: 98.3 (03 Aug 2018 20:20)  HR: 81 (04 Aug 2018 16:18) (58 - 81)  BP: 142/81 (04 Aug 2018 16:18) (142/81 - 193/92)  BP(mean): --  RR: 18 (04 Aug 2018 16:18) (16 - 18)  SpO2: 94% (04 Aug 2018 16:18) (93% - 97%)    LABS:                        11.6   3.9   )-----------( 46       ( 04 Aug 2018 11:04 )             33.8     08-04    134<L>  |  99  |  20  ----------------------------<  150<H>  3.5   |  21<L>  |  0.89    Ca    8.8      04 Aug 2018 11:01  Phos  3.3     08-04  Mg     2.0     08-04    TPro  6.7  /  Alb  4.0  /  TBili  0.6  /  DBili  x   /  AST  28  /  ALT  19  /  AlkPhos  87  08-03    INR: 2.75 ratio <H> [0.88 - 1.16] (08-04 @ 11:20)  WBC Count: 3.9 K/uL [3.8 - 10.5] (08-04 @ 11:04)  Platelet Count - Automated: 46 K/uL <L> [150 - 400] (08-04 @ 11:04)  WBC Count: 5.6 K/uL [3.8 - 10.5] (08-04 @ 07:36)  Platelet Count - Automated: 47 K/uL <L> [150 - 400] (08-04 @ 07:36)  Platelet Count - Automated: 42 K/uL <L> [150 - 400] (08-03 @ 21:37)  WBC Count: 6.3 K/uL [3.8 - 10.5] (08-03 @ 21:37)  INR: 3.36 ratio <H> [0.88 - 1.16] (08-03 @ 21:37)        CLINICAL EXAM: Limited intraoral exam completed. Pt is hemostatic and no signs of acute infection. No further dental follow up needed.     RECOMMENDATIONS:  1) F/U with outpatient dentist for comprehensive dental care upon discharge.  2) If swelling, fever, difficulty breathing/swallowing occurs, page Dental.     Armando Yee DDS and Sivan Aviles DDS, Pager #[phone]
Patient is a 87y old  Male who presents with a chief complaint of bleeding from mouth (05 Aug 2018 12:19)      SUBJECTIVE / OVERNIGHT EVENTS: Patient seen and evaluated at bedside, no acute events overnight. Denies any chest pain, SOB, abdominal pain and n/v     ROS:  All other review of systems negative    Allergies    No Known Allergies      MEDICATIONS  (STANDING):  allopurinol 100 milliGRAM(s) Oral daily  docusate sodium 100 milliGRAM(s) Oral two times a day  ezetimibe 10 milliGRAM(s) Oral at bedtime  finasteride 5 milliGRAM(s) Oral daily  furosemide    Tablet 20 milliGRAM(s) Oral <User Schedule>  furosemide    Tablet 40 milliGRAM(s) Oral <User Schedule>  hydrALAZINE 50 milliGRAM(s) Oral every 8 hours  loratadine 10 milliGRAM(s) Oral daily  pantoprazole    Tablet 40 milliGRAM(s) Oral before breakfast  PARoxetine 30 milliGRAM(s) Oral daily  senna 2 Tablet(s) Oral at bedtime  simvastatin 40 milliGRAM(s) Oral at bedtime  tamsulosin 0.4 milliGRAM(s) Oral at bedtime  warfarin 4 milliGRAM(s) Oral once    MEDICATIONS  (PRN):  ALPRAZolam 0.25 milliGRAM(s) Oral at bedtime PRN anxiety      Vital Signs Last 24 Hrs  T(C): 37.1 (05 Aug 2018 07:56), Max: 37.1 (05 Aug 2018 07:56)  T(F): 98.7 (05 Aug 2018 07:56), Max: 98.7 (05 Aug 2018 07:56)  HR: 73 (05 Aug 2018 07:56) (70 - 81)  BP: 132/75 (05 Aug 2018 07:56) (123/74 - 142/81)  BP(mean): --  RR: 18 (05 Aug 2018 07:56) (17 - 18)  SpO2: 96% (05 Aug 2018 07:56) (94% - 96%)  CAPILLARY BLOOD GLUCOSE        I&O's Summary    04 Aug 2018 07:01  -  05 Aug 2018 07:00  --------------------------------------------------------  IN: 840 mL / OUT: 800 mL / NET: 40 mL    05 Aug 2018 07:01  -  05 Aug 2018 14:10  --------------------------------------------------------  IN: 240 mL / OUT: 400 mL / NET: -160 mL        PHYSICAL EXAM:  GENERAL: NAD, well-developed  HEAD:  Atraumatic, Normocephalic  EYES: EOMI, PERRLA, conjunctiva and sclera clear  NECK: Supple, No JVD  CHEST/LUNG: Clear to auscultation bilaterally; No wheeze  HEART: Regular rate and rhythm; No murmurs, rubs, or gallops  ABDOMEN: Soft, Nontender, Nondistended; Bowel sounds present  EXTREMITIES:  2+ Peripheral Pulses, No clubbing, cyanosis, or edema  NEUROLOGY: AAOx3, non-focal  PSYCH: calm  SKIN: No rashes or lesions    LABS:                        10.7   6.95  )-----------( 55       ( 05 Aug 2018 09:04 )             30.8     08-05    135  |  101  |  28<H>  ----------------------------<  104<H>  3.7   |  23  |  1.02    Ca    8.7      05 Aug 2018 08:05  Phos  3.3     08-04  Mg     2.0     08-04    TPro  6.7  /  Alb  4.0  /  TBili  0.6  /  DBili  x   /  AST  28  /  ALT  19  /  AlkPhos  87  08-03    PT/INR - ( 05 Aug 2018 09:03 )   PT: 27.4 sec;   INR: 2.38 ratio         PTT - ( 03 Aug 2018 21:37 )  PTT:43.6 sec          RADIOLOGY & ADDITIONAL TESTS:    Imaging Personally: none to review     Consultant(s) Notes Reviewed:  Heme/Onc , dental and PT recs noted

## 2018-08-05 NOTE — DISCHARGE NOTE ADULT - SECONDARY DIAGNOSIS.
Other hyperlipidemia Essential hypertension Chronic ITP (idiopathic thrombocytopenic purpura) Chronic atrial fibrillation Benign prostatic hyperplasia, unspecified whether lower urinary tract symptoms present

## 2018-08-05 NOTE — DISCHARGE NOTE ADULT - PATIENT PORTAL LINK FT
You can access the [facility] Patient Portal, offered by [facility], by registering with the following website: [email or web address]

## 2018-08-05 NOTE — DISCHARGE NOTE ADULT - CARE PROVIDERS DIRECT ADDRESSES
[email or web address]

## 2018-08-05 NOTE — PHYSICAL THERAPY INITIAL EVALUATION ADULT - DISCHARGE DISPOSITION, PT EVAL
home/home w/ assist/outpatient PT services for gait and balance, endurance training, home with assist for ADLs./home w/ outpatient services

## 2018-08-05 NOTE — DISCHARGE NOTE ADULT - PLAN OF CARE
improved please follow up with pMD and hematology. Monitor cbc Coronary artery disease is a condition where the arteries the supply the heart muscle get clogges with fatty deposits & puts you at risk for a heart attack  Call your doctor if you have any new pain, pressure, or discomfort in the center of your chest, pain, tingling or discomfort in arms, back, neck, jaw, or stomach, shortness of breath, nausea, vomiting, burping or heartburn, sweating, cold and clammy skin, racing or abnormal heartbeat for more than 10 minutes or if they keep coming & going.  Call 911 and do not tr to get to hospital by care  You can help yourself with lefestyle changes (quitting smoking if you smoke), eat lots of fruits & vegetables & low fat dairy products, not a lot of meat & fatty foods, walk or some form of physical activity most days of the week, lose weight if you are overweight  Take your cardiac medication as prescribed to lower cholesterol, to lower blood pressure, aspirin to prevent blood clots, and diabetes control  Make sure to keep appointments with doctor for cardiac follow up care Low salt diet  Activity as tolerated.  Take all medication as prescribed.  Follow up with your medical doctor for routine blood pressure monitoring at your next visit.  Notify your doctor if you have any of the following symptoms:   Dizziness, Lightheadedness, Blurry vision, Headache, Chest pain, Shortness of breath Please follow up with Dr.Kit gardiner outpatient with hematologist at the [facility] for low platelets Atrial fibrillation is the most common heart rhythm problem & has the risk of stroke & heart attack  It helps if you control your blood pressure, not drink more than 1-2 alcohol drinks per day, cut down on caffeine, getting treatment for over active thyroid gland, & getting exercise  Call your doctor if you feel your heart racing or beating unusually, chest tightness or pain, lightheaded, faint, shortness of breath especially with exercise  It is important to take your heart medication as prescribed  You may be on anticoagulation which is very important to take as directed - you may need blood work to monitor drug levels  continue coumadin and check levels You have an enlarged prostate gland which gets bigger as men get older - it is a very common problem and has nothing to do with prostate cancer  Call your doctor if you are urinating more frequently, have trouble starting to urinate, have weak stream, urine leaking or dribbling, and feeling as though bladder is not empty after urination  Your doctor will monitor your prostate with a rectal exam as well as urine or blood testing  You can help yourself by reducing the amount of fluid you drink before going to bed, limiting the amount of alcohol & caffeine you drink   Avoid cold & allergy medication that contain decongestants or antihistamines which make BPH symptoms worse  You can also "double void" by waiting a moment after urinating & trying again  Take your medication as prescribed - one medication helps to relax the muscle around the urethra and the other medication you may take prevents the prostate from growing more or even shrinking the prostate

## 2018-08-05 NOTE — DISCHARGE NOTE ADULT - MEDICATION SUMMARY - MEDICATIONS TO TAKE
I will START or STAY ON the medications listed below when I get home from the hospital:    finasteride 5 mg oral tablet  -- 1 tab(s) by mouth once a day  -- Indication: For Bph    tamsulosin 0.4 mg oral capsule  -- 1 cap(s) by mouth once a day (at bedtime)  -- Indication: For Bph    warfarin 4 mg oral tablet  -- 1 tab(s) by mouth once  -- Indication: For Atrial fibrillation, unspecified type    PARoxetine 30 mg oral tablet  -- 1 tab(s) by mouth once a day  -- Indication: For depression    allopurinol 100 mg oral tablet  -- 1 tab(s) by mouth once a day  -- Indication: For gout    ZyrTEC 10 mg oral tablet  -- 1 tab(s) by mouth once a day  -- Indication: For Allergies    Vytorin 10 mg-40 mg oral tablet  -- 1 tab(s) by mouth once a day  -- Indication: For Cholesterol    ALPRAZolam 0.25 mg oral tablet  -- 1 tab(s) by mouth once a day (at bedtime), As needed, anxiety  -- Indication: For Anxiety    furosemide 20 mg oral tablet  -- 1 tab(s) by mouth at 2pm  -- Indication: For Htn    furosemide 40 mg oral tablet  -- 1 tab(s) by mouth  at 6pm  -- Indication: For Htn and valvular disease    docusate sodium 100 mg oral capsule  -- 1 cap(s) by mouth 2 times a day  -- Indication: For Cosntipation    senna oral tablet  -- 2 tab(s) by mouth once a day (at bedtime)  -- Indication: For Constipation    potassium chloride 10 mEq oral tablet, extended release  -- 1 tab(s) by mouth once a day  -- Indication: For supplementation    pantoprazole 40 mg oral delayed release tablet  -- 1 tab(s) by mouth once a day (before a meal)  -- Indication: For gerd    hydrALAZINE 50 mg oral tablet  -- 1 tab(s) by mouth every 8 hours  -- Indication: For Htn

## 2018-08-05 NOTE — DISCHARGE NOTE ADULT - CARE PROVIDER_API CALL
Morris Mike), Hematology; Medical Oncology  [address] [place] [address]  Phone: [phone]  Fax: [phone]    Annalisa Lopez), Critical Care Medicine; Internal Medicine; Pulmonary Disease  [address] [address]  Phone: [phone]  Fax: [phone]

## 2018-08-05 NOTE — PHYSICAL THERAPY INITIAL EVALUATION ADULT - ADDITIONAL COMMENTS
as per pt, resides in a PH with daughter, 5 stairs to enter with railings, PTA, pt amb (I) with Rollator, required assist for ADLs, +HHA 8H/7D

## 2018-08-05 NOTE — PROGRESS NOTE ADULT - PROBLEM SELECTOR PLAN 7
no pharm ppx given bleed/thrombocytopenia  -start scd  -pt on prn xanax istop Reference #: [id number] c/w xanax 0.25 qd prn

## 2018-08-05 NOTE — PROGRESS NOTE ADULT - PROBLEM SELECTOR PLAN 1
-hemostasis achieved with pressure dressing per dental (resoled)  -1 u plt ordered in setting of ITP and active bleeding  -dental and heme/onc recs noted   -INR supratherapeutic, resolved  - PT rec out patient f/u, plan for discharge today.

## 2018-08-05 NOTE — DISCHARGE NOTE ADULT - HOSPITAL COURSE
87 M PMH AFib on coumadin, CAD (s/p CABG '95), HTN, HLD, Gout, severe AS, ITP, chronic aortoiliac dissection, p/w acute bleeding from mouth after using a toothpick. Pt returned from diner this afternoon, and used a tootpick afterwards.  His daughter returned home and noticed he was bleeding from his gums, and after multiple attempts at stopping the bleeding with pressure that were unsuccessful, they came to the ER. He denies any preceding bleeding, has some chronic unchanged bruising on arms, denies hematemesis/melena/hematochezia. Denies headaches or visual changes. Pt last took coumadin 5 mg yesterday. Denies coughing/choking on blood or food.   Pt was seen by dental and recommended a comprehensive dental workup upon discharge , bleeding subsided, pt was also seen by hematology Dr gardiner and recommended patient to follow up for blood workup due to low platelets and now bleeding. Bleeding precautions while on coumadin for afib . please speak with Dr. Lopez regarding recent hospitalization for mouth bleeding and on coumadin 87 M w AFib on coumadin, CAD (s/p CABG '95) aw acute bleeding from mouth after using a toothpick. Multiple attempts at stopping the bleeding with pressure were unsuccessful.    Plt count 42, INR 3.36 on admission. Seen by Dental in the ER, pressure dressing applied. Given 1 unit of platelets for thrombocytopenia. 1 dose of IV decadron given for ITP.      Bleeding subsided. Also seen by hematology Dr gardiner, outpatient f/u recommended.    Uncontrolled htn in the ED, improved after stat dose of po meds.    Coumadin held for supratherapeutic INR, resumed at a lower dose. D/lissy on above meds with f/u.    Diagnoses: Acute bleeding; Supratherapeutic INR; Thrombocytopenia; ITP; Hypertensive urgency; Chronic atrial fibrillation; GERD

## 2018-08-05 NOTE — PHYSICAL THERAPY INITIAL EVALUATION ADULT - PERTINENT HX OF CURRENT PROBLEM, REHAB EVAL
87yoM with A-fib, CAD, HTN, AS, ITP, p/w acute bleeding from mouth after using a toothpick, now bleeding stopped

## 2018-08-05 NOTE — DISCHARGE NOTE ADULT - CARE PLAN
Principal Discharge DX:	Oral bleeding  Goal:	improved  Assessment and plan of treatment:	please follow up with pMD and hematology. Monitor cbc  Secondary Diagnosis:	Other hyperlipidemia  Assessment and plan of treatment:	Coronary artery disease is a condition where the arteries the supply the heart muscle get clogges with fatty deposits & puts you at risk for a heart attack  Call your doctor if you have any new pain, pressure, or discomfort in the center of your chest, pain, tingling or discomfort in arms, back, neck, jaw, or stomach, shortness of breath, nausea, vomiting, burping or heartburn, sweating, cold and clammy skin, racing or abnormal heartbeat for more than 10 minutes or if they keep coming & going.  Call 911 and do not tr to get to hospital by care  You can help yourself with lefestyle changes (quitting smoking if you smoke), eat lots of fruits & vegetables & low fat dairy products, not a lot of meat & fatty foods, walk or some form of physical activity most days of the week, lose weight if you are overweight  Take your cardiac medication as prescribed to lower cholesterol, to lower blood pressure, aspirin to prevent blood clots, and diabetes control  Make sure to keep appointments with doctor for cardiac follow up care  Secondary Diagnosis:	Essential hypertension  Assessment and plan of treatment:	Low salt diet  Activity as tolerated.  Take all medication as prescribed.  Follow up with your medical doctor for routine blood pressure monitoring at your next visit.  Notify your doctor if you have any of the following symptoms:   Dizziness, Lightheadedness, Blurry vision, Headache, Chest pain, Shortness of breath  Secondary Diagnosis:	Chronic ITP (idiopathic thrombocytopenic purpura)  Assessment and plan of treatment:	Please follow up with Dr.Kit gardiner outpatient with hematologist at the [facility] for low platelets  Secondary Diagnosis:	Chronic atrial fibrillation  Assessment and plan of treatment:	Atrial fibrillation is the most common heart rhythm problem & has the risk of stroke & heart attack  It helps if you control your blood pressure, not drink more than 1-2 alcohol drinks per day, cut down on caffeine, getting treatment for over active thyroid gland, & getting exercise  Call your doctor if you feel your heart racing or beating unusually, chest tightness or pain, lightheaded, faint, shortness of breath especially with exercise  It is important to take your heart medication as prescribed  You may be on anticoagulation which is very important to take as directed - you may need blood work to monitor drug levels  continue coumadin and check levels  Secondary Diagnosis:	Benign prostatic hyperplasia, unspecified whether lower urinary tract symptoms present  Assessment and plan of treatment:	You have an enlarged prostate gland which gets bigger as men get older - it is a very common problem and has nothing to do with prostate cancer  Call your doctor if you are urinating more frequently, have trouble starting to urinate, have weak stream, urine leaking or dribbling, and feeling as though bladder is not empty after urination  Your doctor will monitor your prostate with a rectal exam as well as urine or blood testing  You can help yourself by reducing the amount of fluid you drink before going to bed, limiting the amount of alcohol & caffeine you drink   Avoid cold & allergy medication that contain decongestants or antihistamines which make BPH symptoms worse  You can also "double void" by waiting a moment after urinating & trying again  Take your medication as prescribed - one medication helps to relax the muscle around the urethra and the other medication you may take prevents the prostate from growing more or even shrinking the prostate

## 2018-08-06 ENCOUNTER — RX RENEWAL (OUTPATIENT)
Age: 83
End: 2018-08-06

## 2018-08-06 ENCOUNTER — LABORATORY RESULT (OUTPATIENT)
Age: 83
End: 2018-08-06

## 2018-08-06 ENCOUNTER — APPOINTMENT (OUTPATIENT)
Dept: INTERNAL MEDICINE | Facility: CLINIC | Age: 83
End: 2018-08-06
Payer: MEDICARE

## 2018-08-06 VITALS
WEIGHT: 185 LBS | OXYGEN SATURATION: 96 % | BODY MASS INDEX: 28.13 KG/M2 | SYSTOLIC BLOOD PRESSURE: 120 MMHG | DIASTOLIC BLOOD PRESSURE: 64 MMHG | TEMPERATURE: 98.7 F | HEART RATE: 50 BPM

## 2018-08-06 DIAGNOSIS — Z86.2 PERSONAL HISTORY OF DISEASES OF THE BLOOD AND BLOOD-FORMING ORGANS AND CERTAIN DISORDERS INVOLVING THE IMMUNE MECHANISM: ICD-10-CM

## 2018-08-06 DIAGNOSIS — D69.3 IMMUNE THROMBOCYTOPENIC PURPURA: ICD-10-CM

## 2018-08-06 DIAGNOSIS — R53.81 OTHER MALAISE: ICD-10-CM

## 2018-08-06 DIAGNOSIS — Z79.01 LONG TERM (CURRENT) USE OF ANTICOAGULANTS: ICD-10-CM

## 2018-08-06 LAB
INR PPP: 1.9 RATIO
POCT-PROTHROMBIN TIME: 23 SECS
QUALITY CONTROL: YES

## 2018-08-06 PROCEDURE — 85610 PROTHROMBIN TIME: CPT | Mod: QW

## 2018-08-06 PROCEDURE — 99496 TRANSJ CARE MGMT HIGH F2F 7D: CPT | Mod: 25

## 2018-08-06 PROCEDURE — 36415 COLL VENOUS BLD VENIPUNCTURE: CPT

## 2018-08-06 RX ORDER — HYDRALAZINE HYDROCHLORIDE 50 MG/1
50 TABLET ORAL
Qty: 90 | Refills: 1 | Status: ACTIVE | COMMUNITY
Start: 2017-11-03 | End: 1900-01-01

## 2018-08-06 RX ORDER — WARFARIN 4 MG/1
4 TABLET ORAL
Qty: 5 | Refills: 0 | Status: ACTIVE | COMMUNITY
Start: 2018-08-05

## 2018-08-06 NOTE — ASSESSMENT
[FreeTextEntry1] : On chronic AC for AF\par INR done and 1.9\par See Coumadin flow-sheet\par Recheck INR in 1 week\par \par ITP\par Had platelet infusion while hospitalized for bleeding control\par Repeat CBC sent\par If platelets low will need hematology evaluation\par Monitor CBC closely\par \par Oral hemorrhage \par Advised - no picking at gums; avoid any flossing or brushing of teeth for now\par Can use mouthwash \par To see oral surgeon\par Soft diet\par \par RX for PT given at his request\par To call for any medical issues\par To continue meds, diet, exercise as outlined and to f/u with all M.D.'s\par Full labs sent\par Has f/u appointment and as needed

## 2018-08-06 NOTE — REVIEW OF SYSTEMS
[Vision Problems] : vision problems [Lower Ext Edema] : lower extremity edema [Joint Pain] : joint pain [Back Pain] : back pain [Confusion] : confusion [Unsteady Walk] : ataxia [Memory Loss] : memory loss [Insomnia] : insomnia [Depression] : depression [Easy Bleeding] : easy bleeding [Easy Bruising] : easy bruising [Negative] : Heme/Lymph

## 2018-08-06 NOTE — HISTORY OF PRESENT ILLNESS
[Post-hospitalization from ___ Hospital] : Post-hospitalization from [unfilled] Hospital [Admitted on: ___] : The patient was admitted on [unfilled] [Discharged on ___] : discharged on [unfilled] [Pertinent Labs] : pertinent labs [Discharge Med List] : discharge medication list [Med Reconciliation] : medication reconciliation has been completed [Patient Contacted By: ____] : and contacted by [unfilled] [FreeTextEntry2] : Went to ER after developed excessive and uncontrollable oral bleeding after using a toothpick to dislodge trapped food from his teeth.\par He is on Coumadin for AF and INR was in range.\par He has chronic ITP and has thrombocytopenia.\par Admitted for overnight observation.\par Seen by dental service.\par Given platelets.\par Family reports that he was stopped from flossing his teeth last night.\par No bleeding at present.

## 2018-08-06 NOTE — HEALTH RISK ASSESSMENT
[Intercurrent ED visits] : went to ED [Intercurrent hospitalizations] : was admitted to the hospital  [Any fall with injury in past year] : Patient reported fall with injury in the past year [0] : 2) Feeling down, depressed, or hopeless: Not at all (0) [HIV test declined] : HIV test declined [Hepatitis C test declined] : Hepatitis C test declined [Change in mental status noted] : Change in mental status noted [Behavior] : difficulty with behavior [Learning/Retaining New Information] : difficulty learning/retaining new information [Reasoning] : difficulty with reasoning [Behavioral] : behavioral [With Family] : lives with family [# of Members in Household ___] :  household currently consist of [unfilled] member(s) [Retired] : retired [College] : College [] :  [Feels Safe at Home] : Feels safe at home [Fully functional (bathing, dressing, toileting, transferring, walking, feeding)] : Fully functional (bathing, dressing, toileting, transferring, walking, feeding) [Reports changes in dental health] : Reports changes in dental health [Smoke Detector] : smoke detector [Carbon Monoxide Detector] : carbon monoxide detector [Seat Belt] :  uses seat belt [Sunscreen] : uses sunscreen [Caregiver Concerns] : has caregiver concerns [Discussed at today's visit] : Advance Directives Discussed at today's visit [Designated Healthcare Proxy] : Designated healthcare proxy [Name: ___] : Health Care Proxy's Name: [unfilled]  [Relationship: ___] : Relationship: [unfilled] [] : No [de-identified] : dental [[id number]] : 0 [Language] : denies difficulty with language [Handling Complex Tasks] : denies difficulty handling complex tasks [Spatial Ability and Orientation] : denies difficulty with spatial ability and orientation [Sexually Active] : not sexually active [Reports changes in hearing] : Reports no changes in hearing [Reports changes in vision] : Reports no changes in vision [Guns at Home] : no guns at home [Travel to Developing Areas] : does not  travel to developing areas [TB Exposure] : is not being exposed to tuberculosis [MammogramDate] : NA [PapSmearDate] : NA [BoneDensityDate] : NA [ColonoscopyDate] : NA [de-identified] : with assistance

## 2018-08-06 NOTE — PHYSICAL EXAM
[No Acute Distress] : no acute distress [Well Nourished] : well nourished [Well Developed] : well developed [Well-Appearing] : well-appearing [Normal Voice/Communication] : normal voice/communication [Normal Sclera/Conjunctiva] : normal sclera/conjunctiva [PERRL] : pupils equal round and reactive to light [EOMI] : extraocular movements intact [Normal Outer Ear/Nose] : the outer ears and nose were normal in appearance [No JVD] : no jugular venous distention [Supple] : supple [No Respiratory Distress] : no respiratory distress  [No Accessory Muscle Use] : no accessory muscle use [Normal Rate] : normal rate  [Regular Rhythm] : with a regular rhythm [Normal S1, S2] : normal S1 and S2 [No Extremity Clubbing/Cyanosis] : no extremity clubbing/cyanosis [No Rash] : no rash [Normal Gait] : normal gait [Coordination Grossly Intact] : coordination grossly intact [No Focal Deficits] : no focal deficits [Speech Grossly Normal] : speech grossly normal [Normal Affect] : the affect was normal [Alert and Oriented x3] : oriented to person, place, and time [de-identified] : no active oral bleeding noted; poor dentition [de-identified] : no stridor [de-identified] : R=16 [de-identified] : loud murmur unchanged [de-identified] : b/l LE edema; no cords [High Complexity requires an extensive number of possible diagnoses and/or the management options, extensive complexity of the medical data (tests, etc.) to be reviewed, and a high risk of significant complications, morbidity, and/or mortality as well as c] : High Complexity

## 2018-08-07 ENCOUNTER — INBOUND DOCUMENT (OUTPATIENT)
Age: 83
End: 2018-08-07

## 2018-08-10 LAB
ALBUMIN SERPL ELPH-MCNC: 4.3 G/DL
ALP BLD-CCNC: 74 U/L
ALT SERPL-CCNC: 23 U/L
ANION GAP SERPL CALC-SCNC: 12 MMOL/L
APTT BLD: 38.6 SEC
AST SERPL-CCNC: 31 U/L
BASOPHILS # BLD AUTO: 0.05 K/UL
BASOPHILS NFR BLD AUTO: 0.8 %
BILIRUB SERPL-MCNC: 0.7 MG/DL
BUN SERPL-MCNC: 24 MG/DL
CALCIUM SERPL-MCNC: 9.2 MG/DL
CHLORIDE SERPL-SCNC: 100 MMOL/L
CHOLEST SERPL-MCNC: 111 MG/DL
CHOLEST/HDLC SERPL: 2.4 RATIO
CO2 SERPL-SCNC: 24 MMOL/L
CREAT SERPL-MCNC: 1.1 MG/DL
EOSINOPHIL # BLD AUTO: 0.06 K/UL
EOSINOPHIL NFR BLD AUTO: 1 %
FOLATE SERPL-MCNC: 12.5 NG/ML
GLUCOSE SERPL-MCNC: 115 MG/DL
HBA1C MFR BLD HPLC: 5.6 %
HCT VFR BLD CALC: 35 %
HDLC SERPL-MCNC: 46 MG/DL
HGB BLD-MCNC: 11.1 G/DL
IMM GRANULOCYTES NFR BLD AUTO: 0.2 %
INR PPP: 1.79 RATIO
LDLC SERPL CALC-MCNC: 54 MG/DL
LYMPHOCYTES # BLD AUTO: 1.01 K/UL
LYMPHOCYTES NFR BLD AUTO: 16.6 %
MAN DIFF?: NORMAL
MCHC RBC-ENTMCNC: 30.7 PG
MCHC RBC-ENTMCNC: 31.7 GM/DL
MCV RBC AUTO: 97 FL
MONOCYTES # BLD AUTO: 0.66 K/UL
MONOCYTES NFR BLD AUTO: 10.9 %
NEUTROPHILS # BLD AUTO: 4.29 K/UL
NEUTROPHILS NFR BLD AUTO: 70.5 %
PLATELET # BLD AUTO: 66 K/UL
POTASSIUM SERPL-SCNC: 4 MMOL/L
PROT SERPL-MCNC: 6.5 G/DL
PT BLD: 20.5 SEC
RBC # BLD: 3.61 M/UL
RBC # FLD: 17.4 %
SODIUM SERPL-SCNC: 137 MMOL/L
TRIGL SERPL-MCNC: 54 MG/DL
TSH SERPL-ACNC: 1.98 UIU/ML
VIT B12 SERPL-MCNC: 487 PG/ML
WBC # FLD AUTO: 6.08 K/UL

## 2018-08-13 ENCOUNTER — APPOINTMENT (OUTPATIENT)
Dept: INTERNAL MEDICINE | Facility: CLINIC | Age: 83
End: 2018-08-13
Payer: MEDICARE

## 2018-08-13 LAB
INR PPP: 2.2 RATIO
POCT-PROTHROMBIN TIME: 25.8 SECS
QUALITY CONTROL: YES

## 2018-08-13 PROCEDURE — 85610 PROTHROMBIN TIME: CPT | Mod: QW

## 2018-08-13 PROCEDURE — 36415 COLL VENOUS BLD VENIPUNCTURE: CPT

## 2018-08-16 ENCOUNTER — RX RENEWAL (OUTPATIENT)
Age: 83
End: 2018-08-16

## 2018-08-16 RX ORDER — POTASSIUM CHLORIDE 750 MG/1
10 CAPSULE, EXTENDED RELEASE ORAL
Qty: 90 | Refills: 1 | Status: ACTIVE | COMMUNITY
Start: 2017-02-03 | End: 1900-01-01

## 2018-08-27 ENCOUNTER — APPOINTMENT (OUTPATIENT)
Dept: INTERNAL MEDICINE | Facility: CLINIC | Age: 83
End: 2018-08-27
Payer: MEDICARE

## 2018-08-27 VITALS
HEART RATE: 71 BPM | DIASTOLIC BLOOD PRESSURE: 80 MMHG | HEIGHT: 68 IN | OXYGEN SATURATION: 94 % | TEMPERATURE: 97.4 F | WEIGHT: 198 LBS | SYSTOLIC BLOOD PRESSURE: 136 MMHG | BODY MASS INDEX: 30.01 KG/M2

## 2018-08-27 DIAGNOSIS — R05 COUGH: ICD-10-CM

## 2018-08-27 PROCEDURE — 99213 OFFICE O/P EST LOW 20 MIN: CPT

## 2018-08-27 NOTE — HISTORY OF PRESENT ILLNESS
[Family Member] : family member [FreeTextEntry8] : Pt comes for an acute visit. Here with his daughter. \par \par He mentions having brief left-sided chest pain yesterday morning but quickly resolved. He then had intermittent right-sided pains under ribcage all day yesterday. He had mild coughing spell as well. No fever or chills. He feels better now. Cough resolved. He has chronic fatigue. He thinks may have a cold. No nasal congestion or sore throat. He took Melatonin 10 mg last night and feels groggy today. He typically does not take any medications for sleep.

## 2018-08-27 NOTE — ASSESSMENT
[FreeTextEntry1] : Patient had a mild cough last night but seems to be improved today. Suggest trial of Mucinex-DM BID for few days. Drink fluids. Can add antihistamine such as claritin PRN. No need for antibiotics at this time. \par \par He will come back in a few weeks for an INR check.

## 2018-08-27 NOTE — PHYSICAL EXAM
[No Acute Distress] : no acute distress [Well Nourished] : well nourished [Well-Appearing] : well-appearing [Normal Oropharynx] : the oropharynx was normal [Normal TMs] : both tympanic membranes were normal [Supple] : supple [No Lymphadenopathy] : no lymphadenopathy [No Respiratory Distress] : no respiratory distress  [Clear to Auscultation] : lungs were clear to auscultation bilaterally [Normal S1, S2] : normal S1 and S2 [Soft] : abdomen soft [Non Tender] : non-tender [Non-distended] : non-distended [Normal Bowel Sounds] : normal bowel sounds [No Joint Swelling] : no joint swelling [Normal Gait] : normal gait [Normal Mood] : the mood was normal [de-identified] : comfortable in chair, eyes closed at times [de-identified] : +cerumen [de-identified] : 3/6 KAREL in RUSB, irregularly irregular [de-identified] : 1-2+ BLE edema, wearing compression stockings

## 2018-08-27 NOTE — REVIEW OF SYSTEMS
[Hoarseness] : hoarseness [Cough] : cough [Fever] : no fever [Chills] : no chills [Fatigue] : no fatigue [Earache] : no earache [Nasal Discharge] : no nasal discharge [Sore Throat] : no sore throat [Chest Pain] : no chest pain [Palpitations] : no palpitations [Shortness Of Breath] : no shortness of breath [Wheezing] : no wheezing [Abdominal Pain] : no abdominal pain [Nausea] : no nausea [Diarrhea] : no diarrhea

## 2018-09-04 ENCOUNTER — RX RENEWAL (OUTPATIENT)
Age: 83
End: 2018-09-04

## 2018-09-05 NOTE — PATIENT PROFILE ADULT. - HEALTHCARE QUESTIONS, PROFILE
Health Maintenance Summary     Topic Due On Due Status Completed On    Colorectal Cancer Screening - Colonoscopy Nov 8, 2027 Not Due Nov 8, 2017    Pneumococcal 19-64 Medium Risk Sep 24, 1986 Overdue     IMMUNIZATION - DTaP/Tdap/Td May 15, 2022 Not Due May 15, 2012    Immunization-Influenza Sep 1, 2018 Due On     Depression Screening Sep 24, 1979 Overdue           Patient is up to date, no discussion needed .           NONE

## 2018-10-30 ENCOUNTER — APPOINTMENT (OUTPATIENT)
Dept: INTERNAL MEDICINE | Facility: CLINIC | Age: 83
End: 2018-10-30

## 2020-10-17 NOTE — DISCHARGE NOTE ADULT - PAIN PRESENT
Administered By (Optional): Elisabeth Dave X Size Of Lesion In Cm (Optional): 0 Kenalog Preparation: Kenalog Include Z78.9 (Other Specified Conditions Influencing Health Status) As An Associated Diagnosis?: No Consent: The risks of atrophy were reviewed with the patient. Treatment Number (Optional): 1 Concentration Of Solution Injected (Mg/Ml): 10.0 Medical Necessity Clause: This procedure was medically necessary because the lesions that were treated were: Total Volume Injected (Ccs- Only Use Numbers And Decimals): 0.1 Detail Level: Zone No

## 2020-12-16 PROBLEM — Z87.09 HISTORY OF SORE THROAT: Status: RESOLVED | Noted: 2018-05-01 | Resolved: 2020-12-16

## 2021-01-08 NOTE — PHYSICAL THERAPY INITIAL EVALUATION ADULT - GENERAL OBSERVATIONS, REHAB EVAL
Pt received reclined in chair +tele monitor, IV lock, chair alarm, RUE sling in NAD, motivated to work with PT
Daughter

## 2021-05-05 NOTE — PROGRESS NOTE ADULT - ASSESSMENT
Called and left a voicemail for patient - Please call back to confirm upcoming appointment with Dr Gleda Ahumada  Provided patient with apt date, time and location  Informed patient that check in is at least 15 minutes prior to apt time  86M s/p fall with distal clavicular fracture and infrarenal AA dissection  - No other injuries identified  - Recommend orthopedic surgery consult for recommendations regarding clavicular fracture  - F/up vascular surgery  Please reconsult trauma surgery PRN    Dacia Decker PGY2

## 2022-11-08 NOTE — PATIENT PROFILE ADULT. - FUNCTIONAL SCREEN CURRENT LEVEL: TOILETING, MLM
(2) assistive person Chonodrocutaneous Helical Advancement Flap Text: The defect edges were debeveled with a #15 scalpel blade.  Given the location of the defect and the proximity to free margins a chondrocutaneous helical advancement flap was deemed most appropriate.  Using a sterile surgical marker, the appropriate advancement flap was drawn incorporating the defect and placing the expected incisions within the relaxed skin tension lines where possible.    The area thus outlined was incised deep to adipose tissue with a #15 scalpel blade.  The skin margins were undermined to an appropriate distance in all directions utilizing iris scissors.

## 2024-02-15 NOTE — DISCHARGE NOTE ADULT - CARE PROVIDER_API CALL
Plan: EKG, chest XR, labs, reassess. Plan: EKG, chest XR, labs, reassess.    Labs show normal troponin, no PENNY.  Patient reports he feels much better.  Chest x-ray shows no acute findings.  Blood pressure improved without intervention.  Patient advised to continue taking all of his prescribed medications and follow-up with his primary care doctor within 24 hours for blood pressure recheck.  Patient will be discharged home in stable condition Jeremiah Sandhu (MD), Cardiovascular Disease; Internal Medicine  [address] [facility]  [address]  [place] [address]  Phone: [phone]  Fax: [phone]    Gold Maher (MD), Orthopedics  [address] [place] [address]  Phone: [phone]  Fax: [phone]    Oliver Whitt), Vascular Surgery  [address] [facility] [address]  [place] [address]  Phone: [phone]  Fax: [phone]    Emanuel Ace), Internal Medicine  [address] [place] [address]  Phone: [phone]  Fax: [phone]